# Patient Record
Sex: FEMALE | Race: WHITE | NOT HISPANIC OR LATINO | Employment: FULL TIME | URBAN - METROPOLITAN AREA
[De-identification: names, ages, dates, MRNs, and addresses within clinical notes are randomized per-mention and may not be internally consistent; named-entity substitution may affect disease eponyms.]

---

## 2018-01-24 NOTE — PROGRESS NOTES
Assessment    1  Urinary urgency (345 63) (R39 15)    Plan  Urinary incontinence in female, Urinary urgency    · Myrbetriq 50 MG Oral Tablet Extended Release 24 Hour; Take 1 tablet daily   Rx By: Mj Yu; Dispense: 90 Days ; #:90 Tablet Extended Release 24 Hour; Refill: 3; For: Urinary incontinence in female, Urinary urgency; BEVERLY = N; Verified Transmission to [facility] #[id number] Last Updated By: [employer] A[employer]; 7/25/2016 2:54:23 PM  Urinary urgency    · Follow-up PRN Evaluation and Treatment  Follow-up  Status: Complete  Done:  [id number]   Ordered; For: Urinary urgency; Ordered By: Mj Yu Performed:  Due: [id number]    Discussion/Summary  Discussion Summary:   Urgency incontinence    The patient is doing well with no urinary complaints  A bladder ultrasound was obtained and her PVR was minimal  She will continue to take Myrbetriq daily  Refills were provided  This prescription can be managed by her PCP and she can follow with our office as needed  She was instructed to call with any issues  Chief Complaint  Chief Complaint Free Text Note Form: Incontinence      History of Present Illness  HPI: 38 y/o female recently evaluated for urgency incontinence presents today for follow up  She continues to take Myrbetriq daily  She denies any lower urinary tract symptoms, incontinence, or sanitary pad use  She denies any side effects  She is doing well and is pleased with her urinary pattern  Review of Systems  Complete-Female Urology:   Constitutional: No fever, no chills, feels well, no tiredness, no recent weight gain or weight loss  Respiratory: No complaints of shortness of breath, no wheezing, no cough, no SOB on exertion, no orthopnea, no PND  Cardiovascular: No complaints of slow heart rate, no fast heart rate, no chest pain, no palpitations, no leg claudication, no lower extremity edema     Gastrointestinal: No complaints of abdominal pain, no constipation, no nausea or vomiting, no diarrhea, no bloody stools  Genitourinary: Empty sensation and stream quality good, but no dysuria, no urinary hesitancy, no feelings of urinary urgency, no incontinence, no hematuria and no nocturia  Musculoskeletal: No complaints of arthralgias, no myalgias, no joint swelling or stiffness, no limb pain or swelling  Integumentary: No complaints of skin rash or lesions, no itching, no skin wounds, no breast pain or lump  Hematologic/Lymphatic: No complaints of swollen glands, no swollen glands in the neck, does not bleed easily, does not bruise easily  Neurological: No complaints of headache, no confusion, no convulsions, no numbness, no dizziness or fainting, no tingling, no limb weakness, no difficulty walking  ROS Reviewed:   ROS reviewed  Active Problems    1  Herniated disc (722 2)   2  Urinary incontinence in female (788 30) (R32)   3  Urinary urgency (788 63) (R39 15)    Past Medical History  Active Problems And Past Medical History Reviewed: The active problems and past medical history were reviewed and updated today  Surgical History  Surgical History Reviewed: The surgical history was reviewed and updated today  Family History  Father    1  Family history of diabetes mellitus (V18 0) (Z83 3)   2  Family history of hypertension (V17 49) (Z82 49)  Sister    3  Family history of cerebrovascular accident (V17 1) (Z82 3)   4  Family history of gout (V18 19) (Z82 69)  Family History Reviewed: The family history was reviewed and updated today  Social History    · Current every day smoker (305 1) (F17 200)   ·    · Social alcohol use (Z78 9)  Social History Reviewed: The social history was reviewed and updated today  The social history was reviewed and is unchanged  Current Meds   1  Flexeril TABS; Therapy: (Recorded:88Sed1227) to Recorded   2  Ibuprofen 600 MG Oral Tablet; Therapy: (Recorded:14Hrx0504) to Recorded   3   Methocarbamol 750 MG Oral Tablet; Therapy: (Recorded:98Udu7431) to Recorded   4  Myrbetriq 50 MG Oral Tablet Extended Release 24 Hour; Take 1 tablet daily; Therapy: 30Apr2015 to (Evaluate:28Oct2016)  Requested for: 30Jun2016; Last   Rx:30Jun2016 Ordered  Medication List Reviewed: The medication list was reviewed and updated today  Allergies    1  No Known Drug Allergies    2  Nickel    Vitals  Vital Signs    Recorded: 07GUR4746 61:87CV   Systolic 365   Diastolic 70   Heart Rate 80   Height 5 ft 2 in   Weight 195 lb    BMI Calculated 35 67   BSA Calculated 1 89     Physical Exam    Constitutional   General appearance: No acute distress, well appearing and well nourished  Pulmonary   Respiratory effort: No increased work of breathing or signs of respiratory distress  Cardiovascular   Palpation of heart: Normal PMI, no thrills  Examination of extremities for edema and/or varicosities: Normal     Abdomen   Abdomen: Non-tender, no masses  Musculoskeletal   Gait and station: Normal     Skin   Skin and subcutaneous tissue: Normal without rashes or lesions  Procedure    Procedure: Bladder Ultrasound Post Void Residual    Test indication: Urge Incontinence  Equipment And Procedure: The patient voided  U/S Findings: PVR - 33 30ML's  The patient tolerated the procedure well  There were no complications        Signatures   Electronically signed by : KAYLEE Moscoso; Jul 25 2016  2:55PM EST                       (Author)    Electronically signed by : YI England ; Jul 27 2016  9:59PM EST

## 2021-06-14 ENCOUNTER — APPOINTMENT (EMERGENCY)
Dept: CT IMAGING | Facility: HOSPITAL | Age: 41
End: 2021-06-14
Payer: COMMERCIAL

## 2021-06-14 ENCOUNTER — HOSPITAL ENCOUNTER (EMERGENCY)
Facility: HOSPITAL | Age: 41
Discharge: HOME | End: 2021-06-14
Attending: EMERGENCY MEDICINE | Admitting: EMERGENCY MEDICINE
Payer: COMMERCIAL

## 2021-06-14 VITALS
DIASTOLIC BLOOD PRESSURE: 55 MMHG | SYSTOLIC BLOOD PRESSURE: 114 MMHG | TEMPERATURE: 98.7 F | OXYGEN SATURATION: 96 % | HEART RATE: 64 BPM | RESPIRATION RATE: 18 BRPM

## 2021-06-14 DIAGNOSIS — L03.211 FACIAL CELLULITIS: Primary | ICD-10-CM

## 2021-06-14 DIAGNOSIS — R22.0 MANDIBULAR SWELLING: ICD-10-CM

## 2021-06-14 LAB
ANION GAP SERPL CALCULATED.3IONS-SCNC: 8 MMOL/L (ref 4–13)
BASOPHILS # BLD AUTO: 0.03 THOUSANDS/ΜL (ref 0–0.1)
BASOPHILS NFR BLD AUTO: 0 % (ref 0–1)
BUN SERPL-MCNC: 7 MG/DL (ref 5–25)
CALCIUM SERPL-MCNC: 8.8 MG/DL (ref 8.3–10.1)
CHLORIDE SERPL-SCNC: 104 MMOL/L (ref 100–108)
CO2 SERPL-SCNC: 28 MMOL/L (ref 21–32)
CREAT SERPL-MCNC: 0.79 MG/DL (ref 0.6–1.3)
EOSINOPHIL # BLD AUTO: 0.08 THOUSAND/ΜL (ref 0–0.61)
EOSINOPHIL NFR BLD AUTO: 1 % (ref 0–6)
ERYTHROCYTE [DISTWIDTH] IN BLOOD BY AUTOMATED COUNT: 12 % (ref 11.6–15.1)
GFR SERPL CREATININE-BSD FRML MDRD: 95 ML/MIN/1.73SQ M
GLUCOSE SERPL-MCNC: 136 MG/DL (ref 65–140)
HCG SERPL QL: NEGATIVE
HCT VFR BLD AUTO: 41.3 % (ref 34.8–46.1)
HGB BLD-MCNC: 14 G/DL (ref 11.5–15.4)
IMM GRANULOCYTES # BLD AUTO: 0.04 THOUSAND/UL (ref 0–0.2)
IMM GRANULOCYTES NFR BLD AUTO: 1 % (ref 0–2)
LYMPHOCYTES # BLD AUTO: 1.59 THOUSANDS/ΜL (ref 0.6–4.47)
LYMPHOCYTES NFR BLD AUTO: 20 % (ref 14–44)
MCH RBC QN AUTO: 32 PG (ref 26.8–34.3)
MCHC RBC AUTO-ENTMCNC: 33.9 G/DL (ref 31.4–37.4)
MCV RBC AUTO: 94 FL (ref 82–98)
MONOCYTES # BLD AUTO: 0.66 THOUSAND/ΜL (ref 0.17–1.22)
MONOCYTES NFR BLD AUTO: 8 % (ref 4–12)
NEUTROPHILS # BLD AUTO: 5.57 THOUSANDS/ΜL (ref 1.85–7.62)
NEUTS SEG NFR BLD AUTO: 70 % (ref 43–75)
NRBC BLD AUTO-RTO: 0 /100 WBCS
PLATELET # BLD AUTO: 219 THOUSANDS/UL (ref 149–390)
PMV BLD AUTO: 10.3 FL (ref 8.9–12.7)
POTASSIUM SERPL-SCNC: 3.6 MMOL/L (ref 3.5–5.3)
RBC # BLD AUTO: 4.38 MILLION/UL (ref 3.81–5.12)
SODIUM SERPL-SCNC: 140 MMOL/L (ref 136–145)
WBC # BLD AUTO: 7.97 THOUSAND/UL (ref 4.31–10.16)

## 2021-06-14 PROCEDURE — 84703 CHORIONIC GONADOTROPIN ASSAY: CPT | Performed by: EMERGENCY MEDICINE

## 2021-06-14 PROCEDURE — 85025 COMPLETE CBC W/AUTO DIFF WBC: CPT | Performed by: EMERGENCY MEDICINE

## 2021-06-14 PROCEDURE — 96375 TX/PRO/DX INJ NEW DRUG ADDON: CPT

## 2021-06-14 PROCEDURE — 70491 CT SOFT TISSUE NECK W/DYE: CPT

## 2021-06-14 PROCEDURE — 99283 EMERGENCY DEPT VISIT LOW MDM: CPT

## 2021-06-14 PROCEDURE — 96365 THER/PROPH/DIAG IV INF INIT: CPT

## 2021-06-14 PROCEDURE — 99284 EMERGENCY DEPT VISIT MOD MDM: CPT | Performed by: EMERGENCY MEDICINE

## 2021-06-14 PROCEDURE — 36415 COLL VENOUS BLD VENIPUNCTURE: CPT | Performed by: EMERGENCY MEDICINE

## 2021-06-14 PROCEDURE — 80048 BASIC METABOLIC PNL TOTAL CA: CPT | Performed by: EMERGENCY MEDICINE

## 2021-06-14 RX ORDER — PREDNISONE 20 MG/1
40 TABLET ORAL DAILY
Qty: 10 TABLET | Refills: 0 | Status: SHIPPED | OUTPATIENT
Start: 2021-06-14 | End: 2021-06-19

## 2021-06-14 RX ORDER — KETOROLAC TROMETHAMINE 30 MG/ML
15 INJECTION, SOLUTION INTRAMUSCULAR; INTRAVENOUS ONCE
Status: COMPLETED | OUTPATIENT
Start: 2021-06-14 | End: 2021-06-14

## 2021-06-14 RX ORDER — AMOXICILLIN AND CLAVULANATE POTASSIUM 875; 125 MG/1; MG/1
1 TABLET, FILM COATED ORAL EVERY 12 HOURS SCHEDULED
Qty: 20 TABLET | Refills: 0 | Status: SHIPPED | OUTPATIENT
Start: 2021-06-14 | End: 2021-06-24

## 2021-06-14 RX ADMIN — IOHEXOL 100 ML: 350 INJECTION, SOLUTION INTRAVENOUS at 05:59

## 2021-06-14 RX ADMIN — KETOROLAC TROMETHAMINE 15 MG: 30 INJECTION, SOLUTION INTRAMUSCULAR at 04:49

## 2021-06-14 RX ADMIN — SODIUM CHLORIDE 3 G: 9 INJECTION, SOLUTION INTRAVENOUS at 08:11

## 2021-06-14 NOTE — ED CARE HANDOFF
Emergency Department Sign Out Note        Sign out and transfer of care from Dr Redd Gary  See Separate Emergency Department note  The patient, Enrique Fall, was evaluated by the previous provider for facial cellulitis  Workup Completed:  CT scan showing cellulitis    ED Course / Workup Pending (followup): [facility] contacted, awaiting response for follow up vs inpatient  I spoke with Dr Magnus Espino for [facility] who recommends oral antibiotics, steroids and follow up in office for emergency eval  I relayed this to the patient, she appears well, eating upon discharge                                     Procedures  MDM    Disposition  Final diagnoses:   Facial cellulitis   Mandibular swelling     Time reflects when diagnosis was documented in both MDM as applicable and the Disposition within this note     Time User Action Codes Description Comment    6/14/2021  7:21 AM Tsosie Finder Add [Q30 808] Facial cellulitis     6/14/2021  7:21 AM Tsosie Finder Add [R22 0] Mandibular swelling       ED Disposition     ED Disposition Condition Date/Time Comment    Discharge Stable Mon Jun 14, 2021  8:09 AM Enrique Fall discharge to home/self care  Follow-up Information     Follow up With Specialties Details Why Contact Info Additional [address]  for Oral and Maxillofacial Surgery [place]  Schedule an appointment as soon as possible for a visit  Follow up and reassessment   [address] [facility] Emergency Medicine Go to  If symptoms worsen 34 [facility] [id number] [facility] Emergency Department, [address], [place], [facility]        Patient's Medications   Discharge Prescriptions    AMOXICILLIN-CLAVULANATE (AUGMENTIN) 875-125 MG PER TABLET    Take 1 tablet by mouth every 12 (twelve) hours for 10 days       Start Date: 6/14/2021 End Date: 6/24/2021       Order Dose: 1 tablet       Quantity: 20 tablet    Refills: 0    PREDNISONE 20 MG TABLET    Take 2 tablets (40 mg total) by mouth daily for 5 days       Start Date: 6/14/2021 End Date: 6/19/2021       Order Dose: 40 mg       Quantity: 10 tablet    Refills: 0     No discharge procedures on file         ED Provider  Electronically Signed by     Samm Carballo MD  06/14/21 9427

## 2021-06-14 NOTE — ED PROVIDER NOTES
History  Chief Complaint   Patient presents with    Oral Swelling     right facial swelling, possible abcess     47 y o  female presents with 2 days of right lower dental pain and mandibular swelling   Patient describes severe aching pain that started gradually with progression of the swelling and pain and continues in the ER   Patient states nothing clearly aggravates the pain and nothing alleviates it   Patient has been taking acetaminophen for the pain with limited relief  Patient states she saw dentist a few months ago who suggested extraction of her teeth however she has been unable to follow-up secondary to work  Patient denies any recent dental procedures  Patient denies any trauma        Patient denies any history of diabetes, malnutrition, alcoholism, or immunocompromised state   Patient denies any history of intravenous drug use; note that the medical record has a history of prior overdose thought to be secondary to opiate pain medication and also lists a history of cocaine abuse though there is no history of IV drug use that I can find in the medical record, patient denies all of this   Patient denies use of phenytoin, cyclosporine, calcium channel blockers  Patient does note a history of MRSA in her right axilla, no prior history of oropharyngeal resistant organisms  Objective:  HENT: Head normocephalic  Melony Lean is significant swelling of the right maxillary region compared to the right  Trismus not present, no pooled secretions   Normal lingual exam with no elevation or protusion of the tongue    Normal sublingual exam; no clinical signs of Arvinds Angina   Normal labial mucosa with no lesions or masses noted   Normal soft palate with no bulging or fluctuant noted   No tonsillar erythema noted or exudates noted, there is no tonsillar asymmetry and the uvula and raphe are midline  No apparent sialolith can be identified    Teeth: gingiva grossly normal without ulceration or pseudomembranes, bleeding not present; no clinical signs of ANUG   Poor overall dental hygiene including multiple prior erosion of teeth and dental caries that are obvious   The location of patient's tooth 30/31 in the area of the patients pain though there is no clear remnant of the tooth in this region   Area palpated and fluctuant area possibly amenable to drainage not present  Neck: Normal inspection with no erythema or asymmetry; no clinical signs of Lemierres syndrome   Supple with normal range of motion and without nuchal rigidity   No masses or nodes on palpitation   Normal palpitation of the submandibular and sublingual glands   No stridor  Neuro:  Alert  No cranial nerve VII deficit  Medical Decision Making  Patient presents with dental pain demonstrating a large differential   I have reviewed the patients vital signs, nursing note, and other relevant information   I have had a detailed discussion with the patient regarding the history and carefully examined the patient   Based on the patients history and exam, most consistent with dental abscess versus sialagogue with spread to the surrounding tissue   No clinical signs of Arvinds Angina or Lemierres syndrome   No signs of ANUG  Melony Lean is no area that appears suitable for drainage in the emergency room  Concerns considering the progression of the symptoms for potential abscess developing into the region of patient's neck is will obtain CT imaging to evaluate for drainable collection  Will monitor and reassess        History provided by:  Patient  Dental Pain  Location:  Lower  Lower teeth location:  30/RL 1st molar  Quality:  Aching  Severity:  Severe  Onset quality:  Gradual  Timing:  Constant  Progression:  Worsening  Relieved by:  Nothing  Associated symptoms: facial swelling    Associated symptoms: no congestion, no difficulty swallowing, no drooling, no fever, no headaches, no neck pain, no oral bleeding, no oral lesions and no trismus None       History reviewed  No pertinent past medical history  History reviewed  No pertinent surgical history  History reviewed  No pertinent family history  I have reviewed and agree with the history as documented  E-Cigarette/Vaping     E-Cigarette/Vaping Substances     Social History     Tobacco Use    Smoking status: Current Every Day Smoker    Smokeless tobacco: Never Used   Substance Use Topics    Alcohol use: Never    Drug use: Never       Review of Systems   Constitutional: Positive for chills  Negative for fever  HENT: Positive for facial swelling  Negative for congestion, drooling and mouth sores  Musculoskeletal: Negative for neck pain  Neurological: Negative for headaches  All other systems reviewed and are negative  Physical Exam  Physical Exam  Vitals reviewed  Constitutional:       Appearance: Normal appearance  She is well-developed  HENT:      Mouth/Throat:      Mouth: Mucous membranes are moist       Comments: See HPI for details  Eyes:      General: No scleral icterus  Cardiovascular:      Rate and Rhythm: Normal rate and regular rhythm  Pulmonary:      Effort: Pulmonary effort is normal    Abdominal:      Palpations: Abdomen is soft  Musculoskeletal:      Cervical back: Neck supple  Skin:     General: Skin is warm and dry  Neurological:      General: No focal deficit present  Mental Status: She is alert     Psychiatric:         Mood and Affect: Mood normal          Vital Signs  ED Triage Vitals   Temperature Pulse Respirations Blood Pressure SpO2   06/14/21 0424 06/14/21 0424 06/14/21 0424 06/14/21 0424 06/14/21 0424   98 7 °F (37 1 °C) 87 18 145/64 97 %      Temp src Heart Rate Source Patient Position - Orthostatic VS BP Location FiO2 (%)   -- 06/14/21 0424 06/14/21 0424 06/14/21 0424 --    Monitor Sitting Right arm       Pain Score       06/14/21 0449       9           Vitals:    06/14/21 0424 06/14/21 0658   BP: 145/64 114/55   Pulse: 87 64   Patient Position - Orthostatic VS: Sitting Lying         Visual Acuity      ED Medications  Medications   ketorolac (TORADOL) injection 15 mg (15 mg Intravenous Given 6/14/21 0449)   iohexol (OMNIPAQUE) 350 MG/ML injection (MULTI-DOSE) 100 mL (100 mL Intravenous Given 6/14/21 0559)   ampicillin-sulbactam (UNASYN) 3 g in sodium chloride 0 9 % 100 mL IVPB (0 g Intravenous Stopped 6/14/21 0841)       Diagnostic Studies  Results Reviewed     Procedure Component Value Units Date/Time    hCG, qualitative pregnancy [[id number]]  (Normal) Collected: 06/14/21 0449    Lab Status: Final result Specimen: Blood from Arm, Right Updated: 06/14/21 0536     Preg, Serum Negative    Basic metabolic panel [[id number]] Collected: 06/14/21 0449    Lab Status: Final result Specimen: Blood from Arm, Right Updated: 06/14/21 0505     Sodium 140 mmol/L      Potassium 3 6 mmol/L      Chloride 104 mmol/L      CO2 28 mmol/L      ANION GAP 8 mmol/L      BUN 7 mg/dL      Creatinine 0 79 mg/dL      Glucose 136 mg/dL      Calcium 8 8 mg/dL      eGFR 95 ml/min/1 73sq m     Narrative:      [place] guidelines for Chronic Kidney Disease (CKD):     Stage 1 with normal or high GFR (GFR > 90 mL/min/1 73 square meters)    Stage 2 Mild CKD (GFR = 60-89 mL/min/1 73 square meters)    Stage 3A Moderate CKD (GFR = 45-59 mL/min/1 73 square meters)    Stage 3B Moderate CKD (GFR = 30-44 mL/min/1 73 square meters)    Stage 4 Severe CKD (GFR = 15-29 mL/min/1 73 square meters)    Stage 5 End Stage CKD (GFR <15 mL/min/1 73 square meters)  Note: GFR calculation is accurate only with a steady state creatinine    CBC and differential [[id number]] Collected: 06/14/21 0448    Lab Status: Final result Specimen: Blood from Arm, Right Updated: 06/14/21 0500     WBC 7 97 Thousand/uL      RBC 4 38 Million/uL      Hemoglobin 14 0 g/dL      Hematocrit 41 3 %      MCV 94 fL      MCH 32 0 pg      MCHC 33 9 g/dL      RDW 12 0 %      MPV 10 3 fL Platelets 066 Thousands/uL      nRBC 0 /100 WBCs      Neutrophils Relative 70 %      Immat GRANS % 1 %      Lymphocytes Relative 20 %      Monocytes Relative 8 %      Eosinophils Relative 1 %      Basophils Relative 0 %      Neutrophils Absolute 5 57 Thousands/µL      Immature Grans Absolute 0 04 Thousand/uL      Lymphocytes Absolute 1 59 Thousands/µL      Monocytes Absolute 0 66 Thousand/µL      Eosinophils Absolute 0 08 Thousand/µL      Basophils Absolute 0 03 Thousands/µL                  CT soft tissue neck with contrast   Final Result by Adri Barahona MD (06/14 1431)      Mild to moderate subcutaneous and soft tissue inflammatory stranding involving the right mandibular region likely infectious or inflammatory in nature  There is no focal fluid collection to suggest an abscess  No cervical lymphadenopathy or soft tissue neck mass  Workstation performed: [id number]                    Procedures  Procedures         ED Course  ED Course as of [facility] 15 0337   Mon Jun 14, 2021   9900 Patient's CT with diffuse swelling, no localized abscess  Will start patient on antibiotics  Considering patient's hypoesthesia, reached out to [facility] regarding possibility of osteo of the mandible considering her history  Dr Nataliia Orellana will review images and update regarding disposition  SBIRT 22yo+      Most Recent Value   SBIRT (22 yo +)   In order to provide better care to our patients, we are screening all of our patients for alcohol and drug use  Would it be okay to ask you these screening questions? Yes Filed at: 06/14/2021 0428   Initial Alcohol Screen: US AUDIT-C    1  How often do you have a drink containing alcohol?  0 Filed at: 06/14/2021 0428   2  How many drinks containing alcohol do you have on a typical day you are drinking? 0 Filed at: 06/14/2021 0428   3b  FEMALE Any Age, or MALE 65+: How often do you have 4 or more drinks on one occassion?   0 Filed at: 06/14/2021 1619   Audit-C Score  0 Filed at: 06/14/2021 5615   THEO: How many times in the past year have you    Used an illegal drug or used a prescription medication for non-medical reasons? Never Filed at: 06/14/2021 0428                    MDM    Disposition  Final diagnoses:   Facial cellulitis   Mandibular swelling     Time reflects when diagnosis was documented in both MDM as applicable and the Disposition within this note     Time User Action Codes Description Comment    6/14/2021  7:21 AM Imelda Gal Add [D58 427] Facial cellulitis     6/14/2021  7:21 AM Imelda Gal Add [R22 0] Mandibular swelling       ED Disposition     ED Disposition Condition Date/Time Comment    Discharge Stable Mon Jun 14, 2021  8:09 AM Eric Cheek discharge to home/self care  Follow-up Information     Follow up With Specialties Details Why Contact Info Additional [address]  for Oral and Maxillofacial Surgery [place]  Schedule an appointment as soon as possible for a visit  Follow up and reassessment  [address] [facility] Emergency Medicine Go to  If symptoms worsen [address] Emergency Department, [address] [place] [address], [facility]          Discharge Medication List as of 6/14/2021  8:09 AM      START taking these medications    Details   amoxicillin-clavulanate (AUGMENTIN) 875-125 mg per tablet Take 1 tablet by mouth every 12 (twelve) hours for 10 days, Starting Mon 6/14/2021, Until Thu 6/24/2021, Normal      predniSONE 20 mg tablet Take 2 tablets (40 mg total) by mouth daily for 5 days, Starting Mon 6/14/2021, Until Sat 6/19/2021, Normal           No discharge procedures on file      PDMP Review     None          ED Provider  Electronically Signed by           Caden Palma MD  06/15/21 8025

## 2021-10-04 ENCOUNTER — HOSPITAL ENCOUNTER (EMERGENCY)
Facility: HOSPITAL | Age: 41
Discharge: HOME | End: 2021-10-04
Attending: EMERGENCY MEDICINE | Admitting: EMERGENCY MEDICINE
Payer: COMMERCIAL

## 2021-10-04 ENCOUNTER — APPOINTMENT (EMERGENCY)
Dept: RADIOLOGY | Facility: HOSPITAL | Age: 41
End: 2021-10-04
Payer: COMMERCIAL

## 2021-10-04 VITALS
DIASTOLIC BLOOD PRESSURE: 65 MMHG | BODY MASS INDEX: 36.25 KG/M2 | WEIGHT: 197 LBS | SYSTOLIC BLOOD PRESSURE: 107 MMHG | RESPIRATION RATE: 16 BRPM | TEMPERATURE: 98.1 F | HEIGHT: 62 IN | OXYGEN SATURATION: 98 % | HEART RATE: 64 BPM

## 2021-10-04 DIAGNOSIS — R07.9 CHEST PAIN, UNSPECIFIED: Primary | ICD-10-CM

## 2021-10-04 LAB
ANION GAP SERPL CALCULATED.3IONS-SCNC: 7 MMOL/L (ref 4–13)
ATRIAL RATE: 64 BPM
BASOPHILS # BLD AUTO: 0.04 THOUSANDS/ΜL (ref 0–0.1)
BASOPHILS NFR BLD AUTO: 1 % (ref 0–1)
BUN SERPL-MCNC: 13 MG/DL (ref 5–25)
CALCIUM SERPL-MCNC: 9.2 MG/DL (ref 8.3–10.1)
CHLORIDE SERPL-SCNC: 109 MMOL/L (ref 100–108)
CO2 SERPL-SCNC: 26 MMOL/L (ref 21–32)
CREAT SERPL-MCNC: 0.71 MG/DL (ref 0.6–1.3)
EOSINOPHIL # BLD AUTO: 0.14 THOUSAND/ΜL (ref 0–0.61)
EOSINOPHIL NFR BLD AUTO: 2 % (ref 0–6)
ERYTHROCYTE [DISTWIDTH] IN BLOOD BY AUTOMATED COUNT: 12.4 % (ref 11.6–15.1)
GFR SERPL CREATININE-BSD FRML MDRD: 108 ML/MIN/1.73SQ M
GLUCOSE SERPL-MCNC: 101 MG/DL (ref 65–140)
HCT VFR BLD AUTO: 45.4 % (ref 34.8–46.1)
HGB BLD-MCNC: 15 G/DL (ref 11.5–15.4)
IMM GRANULOCYTES # BLD AUTO: 0.03 THOUSAND/UL (ref 0–0.2)
IMM GRANULOCYTES NFR BLD AUTO: 0 % (ref 0–2)
LYMPHOCYTES # BLD AUTO: 2.35 THOUSANDS/ΜL (ref 0.6–4.47)
LYMPHOCYTES NFR BLD AUTO: 31 % (ref 14–44)
MCH RBC QN AUTO: 32.2 PG (ref 26.8–34.3)
MCHC RBC AUTO-ENTMCNC: 33 G/DL (ref 31.4–37.4)
MCV RBC AUTO: 97 FL (ref 82–98)
MONOCYTES # BLD AUTO: 0.42 THOUSAND/ΜL (ref 0.17–1.22)
MONOCYTES NFR BLD AUTO: 6 % (ref 4–12)
NEUTROPHILS # BLD AUTO: 4.72 THOUSANDS/ΜL (ref 1.85–7.62)
NEUTS SEG NFR BLD AUTO: 60 % (ref 43–75)
NRBC BLD AUTO-RTO: 0 /100 WBCS
P AXIS: 68 DEGREES
PLATELET # BLD AUTO: 240 THOUSANDS/UL (ref 149–390)
PMV BLD AUTO: 10.4 FL (ref 8.9–12.7)
POTASSIUM SERPL-SCNC: 4.3 MMOL/L (ref 3.5–5.3)
PR INTERVAL: 134 MS
QRS AXIS: 76 DEGREES
QRSD INTERVAL: 84 MS
QT INTERVAL: 388 MS
QTC INTERVAL: 400 MS
RBC # BLD AUTO: 4.66 MILLION/UL (ref 3.81–5.12)
SODIUM SERPL-SCNC: 142 MMOL/L (ref 136–145)
T WAVE AXIS: 71 DEGREES
TROPONIN I SERPL-MCNC: <0.02 NG/ML
VENTRICULAR RATE: 64 BPM
WBC # BLD AUTO: 7.7 THOUSAND/UL (ref 4.31–10.16)

## 2021-10-04 PROCEDURE — 99285 EMERGENCY DEPT VISIT HI MDM: CPT

## 2021-10-04 PROCEDURE — 93010 ELECTROCARDIOGRAM REPORT: CPT | Performed by: INTERNAL MEDICINE

## 2021-10-04 PROCEDURE — 93005 ELECTROCARDIOGRAM TRACING: CPT

## 2021-10-04 PROCEDURE — 71045 X-RAY EXAM CHEST 1 VIEW: CPT

## 2021-10-04 PROCEDURE — 36415 COLL VENOUS BLD VENIPUNCTURE: CPT | Performed by: NURSE PRACTITIONER

## 2021-10-04 PROCEDURE — 85025 COMPLETE CBC W/AUTO DIFF WBC: CPT | Performed by: NURSE PRACTITIONER

## 2021-10-04 PROCEDURE — 84484 ASSAY OF TROPONIN QUANT: CPT | Performed by: NURSE PRACTITIONER

## 2021-10-04 PROCEDURE — 80048 BASIC METABOLIC PNL TOTAL CA: CPT | Performed by: NURSE PRACTITIONER

## 2021-10-04 PROCEDURE — 99285 EMERGENCY DEPT VISIT HI MDM: CPT | Performed by: NURSE PRACTITIONER

## 2021-10-04 RX ORDER — SODIUM CHLORIDE 9 MG/ML
3 INJECTION INTRAVENOUS
Status: DISCONTINUED | OUTPATIENT
Start: 2021-10-04 | End: 2021-10-04 | Stop reason: HOSPADM

## 2021-11-05 ENCOUNTER — HOSPITAL ENCOUNTER (EMERGENCY)
Facility: HOSPITAL | Age: 41
Discharge: HOME | End: 2021-11-05
Attending: EMERGENCY MEDICINE
Payer: COMMERCIAL

## 2021-11-05 VITALS
SYSTOLIC BLOOD PRESSURE: 116 MMHG | DIASTOLIC BLOOD PRESSURE: 68 MMHG | HEART RATE: 68 BPM | TEMPERATURE: 98.4 F | RESPIRATION RATE: 15 BRPM | OXYGEN SATURATION: 95 %

## 2021-11-05 DIAGNOSIS — F41.9 ANXIETY: Primary | ICD-10-CM

## 2021-11-05 LAB
AMPHETAMINES SERPL QL SCN: NEGATIVE
BARBITURATES UR QL: NEGATIVE
BENZODIAZ UR QL: NEGATIVE
COCAINE UR QL: POSITIVE
ETHANOL EXG-MCNC: 0 MG/DL
EXT PREG TEST URINE: NEGATIVE
EXT. CONTROL ED NAV: NORMAL
FLUAV RNA RESP QL NAA+PROBE: NEGATIVE
FLUBV RNA RESP QL NAA+PROBE: NEGATIVE
METHADONE UR QL: NEGATIVE
OPIATES UR QL SCN: NEGATIVE
OXYCODONE+OXYMORPHONE UR QL SCN: POSITIVE
PCP UR QL: NEGATIVE
RSV RNA RESP QL NAA+PROBE: NEGATIVE
SARS-COV-2 RNA RESP QL NAA+PROBE: NEGATIVE
THC UR QL: POSITIVE

## 2021-11-05 PROCEDURE — 99285 EMERGENCY DEPT VISIT HI MDM: CPT

## 2021-11-05 PROCEDURE — 80307 DRUG TEST PRSMV CHEM ANLYZR: CPT | Performed by: EMERGENCY MEDICINE

## 2021-11-05 PROCEDURE — 0241U HB NFCT DS VIR RESP RNA 4 TRGT: CPT | Performed by: EMERGENCY MEDICINE

## 2021-11-05 PROCEDURE — 81025 URINE PREGNANCY TEST: CPT | Performed by: EMERGENCY MEDICINE

## 2021-11-05 PROCEDURE — 82075 ASSAY OF BREATH ETHANOL: CPT | Performed by: EMERGENCY MEDICINE

## 2021-11-05 PROCEDURE — 99285 EMERGENCY DEPT VISIT HI MDM: CPT | Performed by: EMERGENCY MEDICINE

## 2021-11-05 RX ORDER — ALPRAZOLAM 0.5 MG/1
2 TABLET ORAL ONCE
Status: COMPLETED | OUTPATIENT
Start: 2021-11-05 | End: 2021-11-05

## 2021-11-05 RX ORDER — NICOTINE 21 MG/24HR
21 PATCH, TRANSDERMAL 24 HOURS TRANSDERMAL ONCE
Status: DISCONTINUED | OUTPATIENT
Start: 2021-11-05 | End: 2021-11-05 | Stop reason: HOSPADM

## 2021-11-05 RX ADMIN — NICOTINE 21 MG: 21 PATCH, EXTENDED RELEASE TRANSDERMAL at 11:41

## 2021-11-05 RX ADMIN — ALPRAZOLAM 2 MG: 0.5 TABLET ORAL at 02:29

## 2021-11-05 RX ADMIN — MAGNESIUM OXIDE TAB 400 MG (241.3 MG ELEMENTAL MG) 400 MG: 400 (241.3 MG) TAB at 11:40

## 2022-05-10 ENCOUNTER — HOSPITAL ENCOUNTER (EMERGENCY)
Facility: HOSPITAL | Age: 42
Discharge: HOME | End: 2022-05-10
Attending: EMERGENCY MEDICINE | Admitting: EMERGENCY MEDICINE
Payer: COMMERCIAL

## 2022-05-10 ENCOUNTER — APPOINTMENT (EMERGENCY)
Dept: RADIOLOGY | Facility: HOSPITAL | Age: 42
End: 2022-05-10
Payer: COMMERCIAL

## 2022-05-10 VITALS
SYSTOLIC BLOOD PRESSURE: 114 MMHG | OXYGEN SATURATION: 97 % | DIASTOLIC BLOOD PRESSURE: 65 MMHG | HEART RATE: 75 BPM | RESPIRATION RATE: 22 BRPM | TEMPERATURE: 98.1 F

## 2022-05-10 DIAGNOSIS — R07.9 CHEST PAIN: Primary | ICD-10-CM

## 2022-05-10 LAB
ALBUMIN SERPL BCP-MCNC: 3.8 G/DL (ref 3.5–5)
ALP SERPL-CCNC: 40 U/L (ref 46–116)
ALT SERPL W P-5'-P-CCNC: 20 U/L (ref 12–78)
ANION GAP SERPL CALCULATED.3IONS-SCNC: 10 MMOL/L (ref 4–13)
AST SERPL W P-5'-P-CCNC: 14 U/L (ref 5–45)
ATRIAL RATE: 79 BPM
BASOPHILS # BLD AUTO: 0.04 THOUSANDS/ΜL (ref 0–0.1)
BASOPHILS NFR BLD AUTO: 1 % (ref 0–1)
BILIRUB SERPL-MCNC: 0.39 MG/DL (ref 0.2–1)
BUN SERPL-MCNC: 16 MG/DL (ref 5–25)
CALCIUM SERPL-MCNC: 8.9 MG/DL (ref 8.3–10.1)
CARDIAC TROPONIN I PNL SERPL HS: <2 NG/L
CHLORIDE SERPL-SCNC: 103 MMOL/L (ref 100–108)
CO2 SERPL-SCNC: 27 MMOL/L (ref 21–32)
CREAT SERPL-MCNC: 0.88 MG/DL (ref 0.6–1.3)
D DIMER PPP FEU-MCNC: 0.27 UG/ML FEU
EOSINOPHIL # BLD AUTO: 0.25 THOUSAND/ΜL (ref 0–0.61)
EOSINOPHIL NFR BLD AUTO: 3 % (ref 0–6)
ERYTHROCYTE [DISTWIDTH] IN BLOOD BY AUTOMATED COUNT: 12.1 % (ref 11.6–15.1)
GFR SERPL CREATININE-BSD FRML MDRD: 83 ML/MIN/1.73SQ M
GLUCOSE SERPL-MCNC: 100 MG/DL (ref 65–140)
HCG SERPL QL: NEGATIVE
HCT VFR BLD AUTO: 45.4 % (ref 34.8–46.1)
HGB BLD-MCNC: 15.3 G/DL (ref 11.5–15.4)
IMM GRANULOCYTES # BLD AUTO: 0.02 THOUSAND/UL (ref 0–0.2)
IMM GRANULOCYTES NFR BLD AUTO: 0 % (ref 0–2)
LYMPHOCYTES # BLD AUTO: 4.06 THOUSANDS/ΜL (ref 0.6–4.47)
LYMPHOCYTES NFR BLD AUTO: 50 % (ref 14–44)
MCH RBC QN AUTO: 32.7 PG (ref 26.8–34.3)
MCHC RBC AUTO-ENTMCNC: 33.7 G/DL (ref 31.4–37.4)
MCV RBC AUTO: 97 FL (ref 82–98)
MONOCYTES # BLD AUTO: 0.4 THOUSAND/ΜL (ref 0.17–1.22)
MONOCYTES NFR BLD AUTO: 5 % (ref 4–12)
NEUTROPHILS # BLD AUTO: 3.34 THOUSANDS/ΜL (ref 1.85–7.62)
NEUTS SEG NFR BLD AUTO: 41 % (ref 43–75)
NRBC BLD AUTO-RTO: 0 /100 WBCS
P AXIS: 71 DEGREES
PLATELET # BLD AUTO: 250 THOUSANDS/UL (ref 149–390)
PMV BLD AUTO: 10.3 FL (ref 8.9–12.7)
POTASSIUM SERPL-SCNC: 3.5 MMOL/L (ref 3.5–5.3)
PR INTERVAL: 150 MS
PROT SERPL-MCNC: 7.3 G/DL (ref 6.4–8.2)
QRS AXIS: 66 DEGREES
QRSD INTERVAL: 86 MS
QT INTERVAL: 388 MS
QTC INTERVAL: 444 MS
RBC # BLD AUTO: 4.68 MILLION/UL (ref 3.81–5.12)
SODIUM SERPL-SCNC: 140 MMOL/L (ref 136–145)
T WAVE AXIS: 63 DEGREES
VENTRICULAR RATE: 79 BPM
WBC # BLD AUTO: 8.11 THOUSAND/UL (ref 4.31–10.16)

## 2022-05-10 PROCEDURE — 93010 ELECTROCARDIOGRAM REPORT: CPT | Performed by: INTERNAL MEDICINE

## 2022-05-10 PROCEDURE — 85025 COMPLETE CBC W/AUTO DIFF WBC: CPT | Performed by: EMERGENCY MEDICINE

## 2022-05-10 PROCEDURE — 93005 ELECTROCARDIOGRAM TRACING: CPT

## 2022-05-10 PROCEDURE — 71046 X-RAY EXAM CHEST 2 VIEWS: CPT

## 2022-05-10 PROCEDURE — 99285 EMERGENCY DEPT VISIT HI MDM: CPT | Performed by: EMERGENCY MEDICINE

## 2022-05-10 PROCEDURE — 36415 COLL VENOUS BLD VENIPUNCTURE: CPT | Performed by: EMERGENCY MEDICINE

## 2022-05-10 PROCEDURE — 80053 COMPREHEN METABOLIC PANEL: CPT | Performed by: EMERGENCY MEDICINE

## 2022-05-10 PROCEDURE — 84484 ASSAY OF TROPONIN QUANT: CPT | Performed by: EMERGENCY MEDICINE

## 2022-05-10 PROCEDURE — 85379 FIBRIN DEGRADATION QUANT: CPT | Performed by: EMERGENCY MEDICINE

## 2022-05-10 PROCEDURE — 99285 EMERGENCY DEPT VISIT HI MDM: CPT

## 2022-05-10 PROCEDURE — 84703 CHORIONIC GONADOTROPIN ASSAY: CPT | Performed by: EMERGENCY MEDICINE

## 2022-05-10 RX ORDER — SODIUM CHLORIDE 9 MG/ML
3 INJECTION INTRAVENOUS
Status: DISCONTINUED | OUTPATIENT
Start: 2022-05-10 | End: 2022-05-10 | Stop reason: HOSPADM

## 2022-05-10 NOTE — ED PROVIDER NOTES
History  Chief Complaint   Patient presents with    Chest Pain     pt presents with c/o chest pain that began yesterday, states "i bent over yesterday and it felt like my heart rolled internally" per  pt c/o "funny feeling in head and numbness in L arm"      HPI  46 y o  female presents with 12 hours of left sided chest pain with radiation down the left arm  Patient describes dull pain that started suddenly, has been waxing and waning, and continues in the ER  Patient states bending over worsens the pain and nothing improves the pain  Patient states she felt palpitations when she bent over, worsening the pain  Patient denies pleuritic component to chest pain  Patient denies exertional component to the chest pain  Patient has been seen in the emergency room times for this previously without etiology identified  Notably patient has a history of drug abuse but adamantly denies any recent drug use including denying any use cocaine or crack cocaine  Patient associates nausea without vomiting  Patient also states she has "numbness" in her left arm though she has full sensory in this arm, she states she has a "dull" sensation though there are no neurologic defects clinical examination  Patient states she has a "smoker's cough" that is unchanged today  Patient also affirms lightheadedness but denies any syncopal episodes  Patient denies fever/chills, diaphoresis, dyspnea, hemoptysis, weakness, dizziness, back pain, syncope, focal weakness or numbness, leg pain or swelling  All other review of systems reviewed and noted to be negative  The patient denies a history of atherosclerotic disease (CAD/TIA/CVA/PAD)  Patient denies any history of hypertension  Patient denies hyperlipidemia  Patient denies diabetes  Patient denies obesity  Patient affirms any early family history of CAD (male less than 49yo or female less than 73 yo)    Patient states she has a sister who had an MI in her 30s     Patient affirms any use of tobacco in the past 90 days  The patient denies any use of illicit drugs, including cocaine, as noted above  Patient denies any immobilization of at least 3 days or surgery in the past 4 weeks  Patient affirms any history of DVT or PE  Patient states she was previously diagnosed with a DVT without etiology identified  Patient is not on anticoagulation at present  Patient denies any history or family history of thrombophilia  Patient denies any malignancy with treatment within the past 6 months  Patient denies any use of exogenous estrogen containing compounds  Patient denies pregnancy or recent (less than 6 weeks) pregnancy  Focused Objective  CV:  Normal inspection with no rash, signs of infection, or trauma  Regular rate and rhythm  No murmur  Peripheral pulses intact and equal   Tender to palpitation over area of patient's reported pain  Respiratory:  Lungs clear to auscultation bilaterally without adventitious sounds  Skin:  Warm and dry  No rash or signs of herpes zoster over area of patient's reported pain  Extremities:  Non-tender lower extremities without asymmetry; no clinical signs of DVT  No lower extremity edema  Medical Decision Making    Patient presenting with chest pain with a broad differential, including multiple emergent etiologies  EKG obtained and reviewed independently by myself, which was interpreted by myself as normal sinus rhythm with nonspecific findings that are similar to the prior EKG  Patient placed on cardiac monitoring      Regarding the possibility for ACS, patient's risk stratification by the HEART SCORE:    HEART score:    History 0=Slightly or non-suspicious  ECG 1=Nonspecific repolarization disturbance  Age 0= < 45 years  Risk Factors 1= 1 or 2 risk factors  Troponin 0= Less than or equal to 12 ng/L  Total 2      Score 0-3: 1 7% had a MACE risk  0 4% (1 patient)   36 4% of patients were in this low risk group    Score 4-6: 16 6% had a MACE risk    Score 7-10: 50 1% had a MACE risk       The patient's HEART score is 2  CXR will be obtained to evaluate for alternative pathologies, including pneumothorax or pneumonia  Laboratory analysis including troponin to evaluate for ACS, CBC to evaluate for anemia or leukocytosis, and BMP to evaluate renal function and electrolytes considering possibility of cardiac involvement  Patient has symptoms and history concerning for possible pulmonary embolism  Regarding this etiology, patient's risk stratification by the Wells' Criteria for Pulmonary Embolism (Two Tier Model):  no - clinical signs or symptoms of DVT (3)  no - PE most likely diagnosis (3)  no - Heart rate greater than 100 (1 5)  no - Immobilization at least 3 days OR surgery in the previous 4 weeks (1 5)  yes - Previous, objectively diagnosed PE or DVT (1 5)  no - Hemoptysis (1)  no - Malignancy with treatment within 6 months or palliative (1)    Patient has a Wells' score of greater than 4 points concerning for higher risk, imaging will be obtained with a CTA of the chest with PE protocol to evaluate for potential pulmonary embolism  Patient's risk further evaluated by the [facility] Criteria for Pulmonary Embolism:  no - age is greater than or equal to 50  no - Heart rate greater than 100  no - O2 sat on room air < 95%  yes - Prior history of PE or DVT  no - Recent trauma or surgery  no - Hemoptysis  no - Use of exogenous estrogen  no - Unilateral leg swelling    Patient has a low risk Wells' criteria but is unable to be cleared via the [facility] criteria  As such, a D-Dimer will be ordered for the patient to evaluate for the potential for pulmonary embolism  If positive, CT imaging of the patient's chest will be obtained to evaluate for potential pulmonary embolism      Patient on continuous cardiac monitoring and has been instructed to inform nursing with any change in the character or severity of their chest pain so repeat EKG can be obtained  History provided by:  Patient  Chest Pain  Pain location:  L chest  Pain quality: dull    Pain radiates to:  L arm  Pain severity:  Moderate  Onset quality:  Sudden  Timing:  Constant  Progression:  Unchanged  Chronicity:  Recurrent  Relieved by:  Nothing  Worsened by:  Certain positions  Associated symptoms: anxiety, nausea and palpitations    Associated symptoms: no abdominal pain, no altered mental status, no anorexia, no back pain, no claudication, no cough, no diaphoresis, no dizziness, no dysphagia, no fatigue, no fever, no headache, no heartburn, no lower extremity edema, no near-syncope, no orthopnea, no shortness of breath, no syncope, not vomiting and no weakness        None       Past Medical History:   Diagnosis Date    Depression        History reviewed  No pertinent surgical history  History reviewed  No pertinent family history  I have reviewed and agree with the history as documented  E-Cigarette/Vaping     E-Cigarette/Vaping Substances     Social History     Tobacco Use    Smoking status: Current Every Day Smoker     Packs/day: 1 50    Smokeless tobacco: Never Used   Substance Use Topics    Alcohol use: Never    Drug use: Never       Review of Systems   Constitutional: Negative for diaphoresis, fatigue and fever  HENT: Negative for trouble swallowing  Respiratory: Negative for cough and shortness of breath  Cardiovascular: Positive for chest pain and palpitations  Negative for orthopnea, claudication, syncope and near-syncope  Gastrointestinal: Positive for nausea  Negative for abdominal pain, anorexia, heartburn and vomiting  Musculoskeletal: Negative for back pain  Neurological: Negative for dizziness, weakness and headaches  All other systems reviewed and are negative  Physical Exam  Physical Exam  Vitals reviewed  HENT:      Head: Atraumatic  Eyes:      Pupils: Pupils are equal, round, and reactive to light  Cardiovascular:      Rate and Rhythm: Normal rate  Heart sounds: Normal heart sounds  Pulmonary:      Breath sounds: Normal breath sounds  Abdominal:      General: There is no distension  Palpations: Abdomen is soft  Tenderness: There is no abdominal tenderness  There is no guarding or rebound  Musculoskeletal:         General: No deformity  Cervical back: Neck supple  Right lower leg: No tenderness  No edema  Left lower leg: No tenderness  No edema  Skin:     General: Skin is warm and dry  Neurological:      General: No focal deficit present  Mental Status: She is alert  Cranial Nerves: No cranial nerve deficit  Sensory: Sensation is intact  Motor: No weakness  Coordination: Coordination is intact  Gait: Gait is intact  Comments: Sensation intact including in the left arm  Normal motor including the left in all dermatomes     Psychiatric:         Mood and Affect: Mood normal          Vital Signs  ED Triage Vitals   Temperature Pulse Respirations Blood Pressure SpO2   05/10/22 0502 05/10/22 0500 05/10/22 0500 05/10/22 0500 05/10/22 0500   98 1 °F (36 7 °C) 77 18 121/72 98 %      Temp Source Heart Rate Source Patient Position - Orthostatic VS BP Location FiO2 (%)   05/10/22 0500 05/10/22 0500 05/10/22 0500 05/10/22 0500 --   Oral Monitor Sitting Left arm       Pain Score       --                  Vitals:    05/10/22 0500 05/10/22 0530 05/10/22 0600   BP: 121/72 130/59 107/64   Pulse: 77 79 72   Patient Position - Orthostatic VS: Sitting           Visual Acuity      ED Medications  Medications   sodium chloride (PF) 0 9 % injection 3 mL (has no administration in time range)       Diagnostic Studies  Results Reviewed     Procedure Component Value Units Date/Time    Comprehensive metabolic panel [[id number]]  (Abnormal) Collected: 05/10/22 0518    Lab Status: Final result Specimen: Blood from Arm, Left Updated: 05/10/22 0634     Sodium 140 mmol/L      Potassium 3 5 mmol/L      Chloride 103 mmol/L      CO2 27 mmol/L      ANION GAP 10 mmol/L      BUN 16 mg/dL      Creatinine 0 88 mg/dL      Glucose 100 mg/dL      Calcium 8 9 mg/dL      AST 14 U/L      ALT 20 U/L      Alkaline Phosphatase 40 U/L      Total Protein 7 3 g/dL      Albumin 3 8 g/dL      Total Bilirubin 0 39 mg/dL      eGFR 83 ml/min/1 73sq m     Narrative:      National Kidney Disease Foundation guidelines for Chronic Kidney Disease (CKD):     Stage 1 with normal or high GFR (GFR > 90 mL/min/1 73 square meters)    Stage 2 Mild CKD (GFR = 60-89 mL/min/1 73 square meters)    Stage 3A Moderate CKD (GFR = 45-59 mL/min/1 73 square meters)    Stage 3B Moderate CKD (GFR = 30-44 mL/min/1 73 square meters)    Stage 4 Severe CKD (GFR = 15-29 mL/min/1 73 square meters)    Stage 5 End Stage CKD (GFR <15 mL/min/1 73 square meters)  Note: GFR calculation is accurate only with a steady state creatinine    HS Troponin 0hr (reflex protocol) [[id number]]  (Normal) Collected: 05/10/22 0511    Lab Status: Final result Specimen: Blood from Arm, Left Updated: 05/10/22 0547     hs TnI 0hr <2 ng/L     hCG, qualitative pregnancy [[id number]]  (Normal) Collected: 05/10/22 0518    Lab Status: Final result Specimen: Blood from Arm, Left Updated: 05/10/22 0544     Preg, Serum Negative    D-dimer, quantitative [[id number]]  (Normal) Collected: 05/10/22 0511    Lab Status: Final result Specimen: Blood from Arm, Left Updated: 05/10/22 0538     D-Dimer, Quant 0 27 ug/ml FEU     CBC and differential [[id number]]  (Abnormal) Collected: 05/10/22 0511    Lab Status: Final result Specimen: Blood from Arm, Left Updated: 05/10/22 0523     WBC 8 11 Thousand/uL      RBC 4 68 Million/uL      Hemoglobin 15 3 g/dL      Hematocrit 45 4 %      MCV 97 fL      MCH 32 7 pg      MCHC 33 7 g/dL      RDW 12 1 %      MPV 10 3 fL      Platelets 143 Thousands/uL      nRBC 0 /100 WBCs      Neutrophils Relative 41 %      Immat GRANS % 0 % Lymphocytes Relative 50 %      Monocytes Relative 5 %      Eosinophils Relative 3 %      Basophils Relative 1 %      Neutrophils Absolute 3 34 Thousands/µL      Immature Grans Absolute 0 02 Thousand/uL      Lymphocytes Absolute 4 06 Thousands/µL      Monocytes Absolute 0 40 Thousand/µL      Eosinophils Absolute 0 25 Thousand/µL      Basophils Absolute 0 04 Thousands/µL                  X-ray chest 2 views   ED Interpretation by Estephania Rose MD (05/10 2964)   No acute findings  Procedures  Procedures         ED Course  ED Course as of 05/10/22 0640   Tue May 10, 2022   1830 Patient's laboratory evaluation unremarkable for the etiology of her symptoms  Discussed potential etiologies, discussed diagnostic uncertainty considering the prior evaluations of today evaluations are without etiology  Discussed follow-up with Cardiology  Patient prefers to follow up with her 's cardiologist   I discussed the need for follow-up with a cardiologist discussed stress testing and additional testing considering recurrence of the symptoms  Patient agreeable with this  Discussed return precautions in detail  SBIRT 22yo+      Most Recent Value   SBIRT (24 yo +)    In order to provide better care to our patients, we are screening all of our patients for alcohol and drug use  Would it be okay to ask you these screening questions? Yes Filed at: 05/10/2022 0507   Initial Alcohol Screen: US AUDIT-C     1  How often do you have a drink containing alcohol? 1 Filed at: 05/10/2022 0507   2  How many drinks containing alcohol do you have on a typical day you are drinking? 1 Filed at: 05/10/2022 0507   3b  FEMALE Any Age, or MALE 65+: How often do you have 4 or more drinks on one occassion? 0 Filed at: 05/10/2022 0507   Audit-C Score 2 Filed at: 05/10/2022 6410   THEO: How many times in the past year have you        Used an illegal drug or used a prescription medication for non-medical reasons? Never Filed at: 05/10/2022 0507                    MDM    Disposition  Final diagnoses:   Chest pain     Time reflects when diagnosis was documented in both MDM as applicable and the Disposition within this note     Time User Action Codes Description Comment    5/10/2022  6:07 AM Collin Ramirez Add [R07 9] Chest pain       ED Disposition     ED Disposition Condition Date/Time Comment    Discharge Stable Tue May 10, 2022  6:06 AM Terisa Lower discharge to home/self care  Follow-up Information     Follow up With Specialties Details Why Contact Info Additional [facility] Schedule an appointment as soon as possible for a visit in 3 days Follow-up and reassessment with Cardiology  Discussed stress testing considering family history and recurrent episodes of chest pain   [address] [place] [address] Emergency Department Emergency Medicine Go to  If symptoms worsen [address] [facility]  [address] [facility] Emergency Department, [address] [place], [place], [facility]          Patient's Medications    No medications on file           PDMP Review     None          ED Provider  Electronically Signed by           Julienne Stout MD  05/10/22 9941

## 2022-10-25 ENCOUNTER — APPOINTMENT (EMERGENCY)
Dept: CT IMAGING | Facility: HOSPITAL | Age: 42
End: 2022-10-25
Payer: COMMERCIAL

## 2022-10-25 ENCOUNTER — HOSPITAL ENCOUNTER (OUTPATIENT)
Facility: HOSPITAL | Age: 42
Setting detail: OBSERVATION
Discharge: HOME | End: 2022-10-26
Attending: EMERGENCY MEDICINE | Admitting: INTERNAL MEDICINE
Payer: COMMERCIAL

## 2022-10-25 DIAGNOSIS — R20.0 FACIAL NUMBNESS: Primary | ICD-10-CM

## 2022-10-25 DIAGNOSIS — R51.9 HEADACHE: ICD-10-CM

## 2022-10-25 DIAGNOSIS — R42 LIGHTHEADEDNESS: ICD-10-CM

## 2022-10-25 DIAGNOSIS — R29.90 STROKE-LIKE SYMPTOMS: ICD-10-CM

## 2022-10-25 DIAGNOSIS — R47.81 SLURRED SPEECH: ICD-10-CM

## 2022-10-25 PROBLEM — Z72.0 TOBACCO ABUSE: Status: ACTIVE | Noted: 2022-10-25

## 2022-10-25 LAB
ALBUMIN SERPL BCP-MCNC: 3.8 G/DL (ref 3.5–5)
ALP SERPL-CCNC: 42 U/L (ref 46–116)
ALT SERPL W P-5'-P-CCNC: 18 U/L (ref 12–78)
ANION GAP SERPL CALCULATED.3IONS-SCNC: 8 MMOL/L (ref 4–13)
AST SERPL W P-5'-P-CCNC: 19 U/L (ref 5–45)
BASOPHILS # BLD AUTO: 0.05 THOUSANDS/ÂΜL (ref 0–0.1)
BASOPHILS NFR BLD AUTO: 1 % (ref 0–1)
BILIRUB SERPL-MCNC: 0.41 MG/DL (ref 0.2–1)
BUN SERPL-MCNC: 16 MG/DL (ref 5–25)
CALCIUM SERPL-MCNC: 8.9 MG/DL (ref 8.3–10.1)
CARDIAC TROPONIN I PNL SERPL HS: <2 NG/L
CHLORIDE SERPL-SCNC: 104 MMOL/L (ref 96–108)
CO2 SERPL-SCNC: 27 MMOL/L (ref 21–32)
CREAT SERPL-MCNC: 0.84 MG/DL (ref 0.6–1.3)
EOSINOPHIL # BLD AUTO: 0.14 THOUSAND/ÂΜL (ref 0–0.61)
EOSINOPHIL NFR BLD AUTO: 1 % (ref 0–6)
ERYTHROCYTE [DISTWIDTH] IN BLOOD BY AUTOMATED COUNT: 12.1 % (ref 11.6–15.1)
GFR SERPL CREATININE-BSD FRML MDRD: 87 ML/MIN/1.73SQ M
GLUCOSE SERPL-MCNC: 91 MG/DL (ref 65–140)
HCG SERPL QL: NEGATIVE
HCT VFR BLD AUTO: 45 % (ref 34.8–46.1)
HGB BLD-MCNC: 15.1 G/DL (ref 11.5–15.4)
IMM GRANULOCYTES # BLD AUTO: 0.03 THOUSAND/UL (ref 0–0.2)
IMM GRANULOCYTES NFR BLD AUTO: 0 % (ref 0–2)
LYMPHOCYTES # BLD AUTO: 3.19 THOUSANDS/ÂΜL (ref 0.6–4.47)
LYMPHOCYTES NFR BLD AUTO: 30 % (ref 14–44)
MCH RBC QN AUTO: 32.1 PG (ref 26.8–34.3)
MCHC RBC AUTO-ENTMCNC: 33.6 G/DL (ref 31.4–37.4)
MCV RBC AUTO: 96 FL (ref 82–98)
MONOCYTES # BLD AUTO: 0.43 THOUSAND/ÂΜL (ref 0.17–1.22)
MONOCYTES NFR BLD AUTO: 4 % (ref 4–12)
NEUTROPHILS # BLD AUTO: 6.72 THOUSANDS/ÂΜL (ref 1.85–7.62)
NEUTS SEG NFR BLD AUTO: 64 % (ref 43–75)
NRBC BLD AUTO-RTO: 0 /100 WBCS
PLATELET # BLD AUTO: 270 THOUSANDS/UL (ref 149–390)
PMV BLD AUTO: 10.3 FL (ref 8.9–12.7)
POTASSIUM SERPL-SCNC: 4 MMOL/L (ref 3.5–5.3)
PROT SERPL-MCNC: 7.4 G/DL (ref 6.4–8.4)
RBC # BLD AUTO: 4.7 MILLION/UL (ref 3.81–5.12)
SODIUM SERPL-SCNC: 139 MMOL/L (ref 135–147)
WBC # BLD AUTO: 10.56 THOUSAND/UL (ref 4.31–10.16)

## 2022-10-25 PROCEDURE — 70496 CT ANGIOGRAPHY HEAD: CPT

## 2022-10-25 PROCEDURE — 99285 EMERGENCY DEPT VISIT HI MDM: CPT | Performed by: EMERGENCY MEDICINE

## 2022-10-25 PROCEDURE — 99219 PR INITIAL OBSERVATION CARE/DAY 50 MINUTES: CPT

## 2022-10-25 PROCEDURE — 36415 COLL VENOUS BLD VENIPUNCTURE: CPT | Performed by: EMERGENCY MEDICINE

## 2022-10-25 PROCEDURE — 70498 CT ANGIOGRAPHY NECK: CPT

## 2022-10-25 PROCEDURE — 85025 COMPLETE CBC W/AUTO DIFF WBC: CPT | Performed by: EMERGENCY MEDICINE

## 2022-10-25 PROCEDURE — 84484 ASSAY OF TROPONIN QUANT: CPT | Performed by: EMERGENCY MEDICINE

## 2022-10-25 PROCEDURE — 84703 CHORIONIC GONADOTROPIN ASSAY: CPT | Performed by: EMERGENCY MEDICINE

## 2022-10-25 PROCEDURE — 93005 ELECTROCARDIOGRAM TRACING: CPT

## 2022-10-25 PROCEDURE — 80053 COMPREHEN METABOLIC PANEL: CPT | Performed by: EMERGENCY MEDICINE

## 2022-10-25 RX ORDER — ASPIRIN 81 MG/1
81 TABLET, CHEWABLE ORAL DAILY
Status: DISCONTINUED | OUTPATIENT
Start: 2022-10-26 | End: 2022-10-26 | Stop reason: HOSPADM

## 2022-10-25 RX ORDER — NICOTINE 21 MG/24HR
1 PATCH, TRANSDERMAL 24 HOURS TRANSDERMAL DAILY
Status: DISCONTINUED | OUTPATIENT
Start: 2022-10-25 | End: 2022-10-26 | Stop reason: HOSPADM

## 2022-10-25 RX ORDER — ATORVASTATIN CALCIUM 40 MG/1
40 TABLET, FILM COATED ORAL EVERY EVENING
Status: DISCONTINUED | OUTPATIENT
Start: 2022-10-25 | End: 2022-10-26 | Stop reason: HOSPADM

## 2022-10-25 RX ORDER — ENOXAPARIN SODIUM 100 MG/ML
40 INJECTION SUBCUTANEOUS DAILY
Status: DISCONTINUED | OUTPATIENT
Start: 2022-10-26 | End: 2022-10-26 | Stop reason: HOSPADM

## 2022-10-25 RX ORDER — ASPIRIN 325 MG
325 TABLET ORAL ONCE
Status: COMPLETED | OUTPATIENT
Start: 2022-10-25 | End: 2022-10-25

## 2022-10-25 RX ADMIN — ATORVASTATIN CALCIUM 40 MG: 40 TABLET, FILM COATED ORAL at 23:16

## 2022-10-25 RX ADMIN — SODIUM CHLORIDE 1000 ML: 0.9 INJECTION, SOLUTION INTRAVENOUS at 19:00

## 2022-10-25 RX ADMIN — IOHEXOL 100 ML: 350 INJECTION, SOLUTION INTRAVENOUS at 19:57

## 2022-10-25 RX ADMIN — NICOTINE 1 PATCH: 21 PATCH, EXTENDED RELEASE TRANSDERMAL at 23:16

## 2022-10-25 RX ADMIN — ASPIRIN 325 MG ORAL TABLET 325 MG: 325 PILL ORAL at 23:16

## 2022-10-25 NOTE — Clinical Note
Case was discussed with Erica Bacon and the patient's admission status was agreed to be Admission Status: observation status to the service of Dr Jerry Stoner

## 2022-10-25 NOTE — ED PROVIDER NOTES
History  Chief Complaint   Patient presents with   • Dizziness     Dizziness that comes and goes, worse with movement, for 1 week  Worsening lightheaded and headache X 1 week  Numbness to left side of face and family reports slurred speech that lasts a few seconds--started last Thursday, has not had any episodes today  HPI  47 yo F presents with multiple symptoms  She reports that for the last week she has had intermittent dizziness with headache, numbness to left side of face and slurred speech lasting a few seconds at a time  She reports that symptoms are currently resolved  States that headache was present behind left eye and radiated across the top of her head  States that she has had headaches in the past and is prescribed magnesium  No fevers, neck stiffness, trauma, weakness, difficulty walking  None       Past Medical History:   Diagnosis Date   • Depression        History reviewed  No pertinent surgical history  History reviewed  No pertinent family history  I have reviewed and agree with the history as documented  E-Cigarette/Vaping     E-Cigarette/Vaping Substances     Social History     Tobacco Use   • Smoking status: Current Every Day Smoker     Packs/day: 1 50   • Smokeless tobacco: Never Used   Substance Use Topics   • Alcohol use: Never   • Drug use: Never       Review of Systems   Constitutional: Negative for chills and fever  HENT: Negative for dental problem and ear pain  Eyes: Negative for pain and redness  Respiratory: Negative for cough and shortness of breath  Cardiovascular: Negative for chest pain and palpitations  Gastrointestinal: Negative for abdominal pain and nausea  Endocrine: Negative for polydipsia and polyphagia  Genitourinary: Negative for dysuria and frequency  Musculoskeletal: Negative for arthralgias and joint swelling  Skin: Negative for color change and rash     Neurological: Positive for dizziness, speech difficulty, light-headedness, numbness and headaches  Psychiatric/Behavioral: Negative for behavioral problems and confusion  All other systems reviewed and are negative  Physical Exam  Physical Exam  Vitals and nursing note reviewed  Constitutional:       General: She is not in acute distress  Appearance: She is well-developed  She is not diaphoretic  HENT:      Head: Atraumatic  Right Ear: External ear normal       Left Ear: External ear normal       Nose: Nose normal    Eyes:      Conjunctiva/sclera: Conjunctivae normal       Pupils: Pupils are equal, round, and reactive to light  Neck:      Vascular: No JVD  Cardiovascular:      Rate and Rhythm: Normal rate and regular rhythm  Heart sounds: Normal heart sounds  No murmur heard  Pulmonary:      Effort: Pulmonary effort is normal  No respiratory distress  Breath sounds: Normal breath sounds  No wheezing  Abdominal:      General: Bowel sounds are normal  There is no distension  Palpations: Abdomen is soft  Tenderness: There is no abdominal tenderness  Musculoskeletal:         General: Normal range of motion  Cervical back: Normal range of motion and neck supple  Skin:     General: Skin is warm and dry  Capillary Refill: Capillary refill takes less than 2 seconds  Neurological:      Mental Status: She is alert and oriented to person, place, and time  Cranial Nerves: No cranial nerve deficit  Comments: CN II-XII intact grossly, no focal deficits  Normal strength and sensation in b/l upper and lower extremities   Normal FNF and rapid alternating hand movements   Psychiatric:         Behavior: Behavior normal          Vital Signs  ED Triage Vitals [10/25/22 1816]   Temperature Pulse Respirations Blood Pressure SpO2   98 9 °F (37 2 °C) 83 16 110/74 98 %      Temp Source Heart Rate Source Patient Position - Orthostatic VS BP Location FiO2 (%)   Oral Monitor Sitting Left arm --      Pain Score       --           Vitals:    10/25/22 1816 10/25/22 1901 10/25/22 2014 10/25/22 2116   BP: 110/74  112/57 105/54   Pulse: 83 67 67 65   Patient Position - Orthostatic VS: Sitting   Lying         Visual Acuity  Visual Acuity    Flowsheet Row Most Recent Value   L Pupil Size (mm) 3   R Pupil Size (mm) 3          ED Medications  Medications   sodium chloride 0 9 % bolus 1,000 mL (0 mL Intravenous Stopped 10/25/22 2119)   iohexol (OMNIPAQUE) 350 MG/ML injection (SINGLE-DOSE) 100 mL (100 mL Intravenous Given 10/25/22 1957)       Diagnostic Studies  Results Reviewed     Procedure Component Value Units Date/Time    HS Troponin 0hr (reflex protocol) [[id number]]  (Normal) Collected: 10/25/22 1859    Lab Status: Final result Specimen: Blood from Arm, Right Updated: 10/25/22 1933     hs TnI 0hr <2 ng/L     Pregnancy Test (HCG Qualitative) [[id number]]  (Normal) Collected: 10/25/22 1859    Lab Status: Final result Specimen: Blood from Arm, Right Updated: 10/25/22 1931     Preg, Serum Negative    Comprehensive metabolic panel [[id number]]  (Abnormal) Collected: 10/25/22 1859    Lab Status: Final result Specimen: Blood from Arm, Right Updated: 10/25/22 1925     Sodium 139 mmol/L      Potassium 4 0 mmol/L      Chloride 104 mmol/L      CO2 27 mmol/L      ANION GAP 8 mmol/L      BUN 16 mg/dL      Creatinine 0 84 mg/dL      Glucose 91 mg/dL      Calcium 8 9 mg/dL      AST 19 U/L      ALT 18 U/L      Alkaline Phosphatase 42 U/L      Total Protein 7 4 g/dL      Albumin 3 8 g/dL      Total Bilirubin 0 41 mg/dL      eGFR 87 ml/min/1 73sq m     Narrative:      Betty guidelines for Chronic Kidney Disease (CKD):   •  Stage 1 with normal or high GFR (GFR > 90 mL/min/1 73 square meters)  •  Stage 2 Mild CKD (GFR = 60-89 mL/min/1 73 square meters)  •  Stage 3A Moderate CKD (GFR = 45-59 mL/min/1 73 square meters)  •  Stage 3B Moderate CKD (GFR = 30-44 mL/min/1 73 square meters)  •  Stage 4 Severe CKD (GFR = 15-29 mL/min/1 73 square meters)  •  Stage 5 End Stage CKD (GFR <15 mL/min/1 73 square meters)  Note: GFR calculation is accurate only with a steady state creatinine    CBC and differential [[id number]]  (Abnormal) Collected: 10/25/22 1859    Lab Status: Final result Specimen: Blood from Arm, Right Updated: 10/25/22 1908     WBC 10 56 Thousand/uL      RBC 4 70 Million/uL      Hemoglobin 15 1 g/dL      Hematocrit 45 0 %      MCV 96 fL      MCH 32 1 pg      MCHC 33 6 g/dL      RDW 12 1 %      MPV 10 3 fL      Platelets 714 Thousands/uL      nRBC 0 /100 WBCs      Neutrophils Relative 64 %      Immat GRANS % 0 %      Lymphocytes Relative 30 %      Monocytes Relative 4 %      Eosinophils Relative 1 %      Basophils Relative 1 %      Neutrophils Absolute 6 72 Thousands/µL      Immature Grans Absolute 0 03 Thousand/uL      Lymphocytes Absolute 3 19 Thousands/µL      Monocytes Absolute 0 43 Thousand/µL      Eosinophils Absolute 0 14 Thousand/µL      Basophils Absolute 0 05 Thousands/µL                  CTA head and neck with and without contrast   Final Result by Lyndsay Everett MD (10/25 2213)         1  No evidence of acute infarct, intracranial hemorrhage or mass  2   No stenosis, dissection or occlusion of the carotid or vertebral arteries or major vessels of the [place]  Workstation performed: [id number]                    Procedures  Procedures         ED Course                               SBIRT 20yo+    Flowsheet Row Most Recent Value   SBIRT (25 yo +)    In order to provide better care to our patients, we are screening all of our patients for alcohol and drug use  Would it be okay to ask you these screening questions? Yes Filed at: 10/25/2022 1901   Initial Alcohol Screen: US AUDIT-C     1  How often do you have a drink containing alcohol? 0 Filed at: 10/25/2022 1901   2  How many drinks containing alcohol do you have on a typical day you are drinking? 0 Filed at: 10/25/2022 1901   3a  Male UNDER 65:  How often do you have five or more drinks on one occasion? 0 Filed at: 10/25/2022 1901   3b  FEMALE Any Age, or MALE 65+: How often do you have 4 or more drinks on one occassion? 0 Filed at: 10/25/2022 1901   Audit-C Score 0 Filed at: 10/25/2022 1901   THEO: How many times in the past year have you    Used an illegal drug or used a prescription medication for non-medical reasons? Never Filed at: 10/25/2022 1901                    [facility]  Patient presents with left sided numbness, slurred speech, headache and dizziness which have been intermittent for past week  CTA head/neck negative, labs unremarkable  Not thrombolytic candidate as symptoms have resolved  Unsure TIA vs complex migraine  Will admit for further workup  Disposition  Final diagnoses:   Facial numbness   Slurred speech   Headache   Lightheadedness     Time reflects when diagnosis was documented in both MDM as applicable and the Disposition within this note     Time User Action Codes Description Comment    10/25/2022 10:27 PM Vincent Sleigh Add [R20 0] Facial numbness     10/25/2022 10:27 PM Vincent Sleigh Add [R47 81] Slurred speech     10/25/2022 10:27 PM Vincent Sleigh Add [R51 9] Headache     10/25/2022 10:27 PM Vincent Sleigh Add [R42] [facility]       ED Disposition     ED Disposition   Admit    Condition   Stable    Date/Time   Tue Oct 25, 2022 10:33 PM    Comment   Case was discussed with Analilia Jessica and the patient's admission status was agreed to be Admission Status: observation status to the service of Dr Jessica Arevalo   Follow-up Information    None         Patient's Medications    No medications on file       No discharge procedures on file      PDMP Review     None          ED Provider  Electronically Signed by           Lg Mendoza MD  10/25/22 6950

## 2022-10-26 ENCOUNTER — APPOINTMENT (OUTPATIENT)
Dept: MRI IMAGING | Facility: HOSPITAL | Age: 42
End: 2022-10-26
Payer: COMMERCIAL

## 2022-10-26 ENCOUNTER — APPOINTMENT (OUTPATIENT)
Dept: NON INVASIVE DIAGNOSTICS | Facility: HOSPITAL | Age: 42
End: 2022-10-26
Payer: COMMERCIAL

## 2022-10-26 VITALS
SYSTOLIC BLOOD PRESSURE: 104 MMHG | BODY MASS INDEX: 32.2 KG/M2 | WEIGHT: 175 LBS | DIASTOLIC BLOOD PRESSURE: 54 MMHG | HEART RATE: 78 BPM | OXYGEN SATURATION: 97 % | HEIGHT: 62 IN | RESPIRATION RATE: 20 BRPM | TEMPERATURE: 97.6 F

## 2022-10-26 PROBLEM — R42 VERTIGO: Status: ACTIVE | Noted: 2022-10-26

## 2022-10-26 PROBLEM — G43.909 MIGRAINE: Status: ACTIVE | Noted: 2022-10-26

## 2022-10-26 LAB
AORTIC ROOT: 2.9 CM
APICAL FOUR CHAMBER EJECTION FRACTION: 69 %
ASCENDING AORTA: 3 CM
CHOLEST SERPL-MCNC: 133 MG/DL
E WAVE DECELERATION TIME: 205 MS
EST. AVERAGE GLUCOSE BLD GHB EST-MCNC: 94 MG/DL
FRACTIONAL SHORTENING: 32 % (ref 28–44)
HBA1C MFR BLD: 4.9 %
HDLC SERPL-MCNC: 47 MG/DL
INTERVENTRICULAR SEPTUM IN DIASTOLE (PARASTERNAL SHORT AXIS VIEW): 0.9 CM
INTERVENTRICULAR SEPTUM: 0.9 CM (ref 0.6–1.1)
LAAS-AP2: 13.2 CM2
LAAS-AP4: 14.5 CM2
LDLC SERPL CALC-MCNC: 72 MG/DL (ref 0–100)
LEFT ATRIUM AREA SYSTOLE SINGLE PLANE A4C: 14.4 CM2
LEFT ATRIUM SIZE: 3.3 CM
LEFT INTERNAL DIMENSION IN SYSTOLE: 3.2 CM (ref 2.1–4)
LEFT VENTRICULAR INTERNAL DIMENSION IN DIASTOLE: 4.7 CM (ref 3.5–6)
LEFT VENTRICULAR POSTERIOR WALL IN END DIASTOLE: 0.8 CM
LEFT VENTRICULAR STROKE VOLUME: 59 ML
LVSV (TEICH): 59 ML
MV E'TISSUE VEL-SEP: 11 CM/S
MV PEAK A VEL: 0.64 M/S
MV PEAK E VEL: 82 CM/S
MV STENOSIS PRESSURE HALF TIME: 59 MS
MV VALVE AREA P 1/2 METHOD: 3.73 CM2
RIGHT ATRIUM AREA SYSTOLE A4C: 16.6 CM2
RIGHT VENTRICLE ID DIMENSION: 3.7 CM
SL CV LEFT ATRIUM LENGTH A2C: 4.4 CM
SL CV LV EF: 60
SL CV PED ECHO LEFT VENTRICLE DIASTOLIC VOLUME (MOD BIPLANE) 2D: 100 ML
SL CV PED ECHO LEFT VENTRICLE SYSTOLIC VOLUME (MOD BIPLANE) 2D: 41 ML
TR MAX PG: 16 MMHG
TR PEAK VELOCITY: 2 M/S
TRICUSPID VALVE PEAK REGURGITATION VELOCITY: 1.99 M/S
TRIGL SERPL-MCNC: 72 MG/DL

## 2022-10-26 PROCEDURE — 70551 MRI BRAIN STEM W/O DYE: CPT

## 2022-10-26 PROCEDURE — 36415 COLL VENOUS BLD VENIPUNCTURE: CPT

## 2022-10-26 PROCEDURE — 83036 HEMOGLOBIN GLYCOSYLATED A1C: CPT

## 2022-10-26 PROCEDURE — G1004 CDSM NDSC: HCPCS

## 2022-10-26 PROCEDURE — 93306 TTE W/DOPPLER COMPLETE: CPT | Performed by: INTERNAL MEDICINE

## 2022-10-26 PROCEDURE — 80061 LIPID PANEL: CPT

## 2022-10-26 PROCEDURE — 93306 TTE W/DOPPLER COMPLETE: CPT

## 2022-10-26 PROCEDURE — 99217 PR OBSERVATION CARE DISCHARGE MANAGEMENT: CPT | Performed by: INTERNAL MEDICINE

## 2022-10-26 PROCEDURE — NC001 PR NO CHARGE: Performed by: INTERNAL MEDICINE

## 2022-10-26 PROCEDURE — 70544 MR ANGIOGRAPHY HEAD W/O DYE: CPT

## 2022-10-26 RX ORDER — ASPIRIN 81 MG/1
81 TABLET, CHEWABLE ORAL DAILY
Qty: 30 TABLET | Refills: 0 | Status: SHIPPED | OUTPATIENT
Start: 2022-10-27 | End: 2022-11-26

## 2022-10-26 RX ADMIN — ASPIRIN 81 MG: 81 TABLET, CHEWABLE ORAL at 08:58

## 2022-10-26 RX ADMIN — NICOTINE 1 PATCH: 21 PATCH, EXTENDED RELEASE TRANSDERMAL at 11:24

## 2022-10-26 NOTE — ASSESSMENT & PLAN NOTE
· Patient with history of migraines  · Patient also admits to ongoing headaches for the past couple of days    Patient did have headache prior to symptom onset  · Likely complex migraine  · Continue to monitor

## 2022-10-26 NOTE — ED NOTES
Pt reports that she took the nicotine patch that was applied last night off  This nurse disposed of the nicotine patch at this time       Rafi Olivier RN  10/26/22 8422

## 2022-10-26 NOTE — ASSESSMENT & PLAN NOTE
Patient reporting intermittent dizziness and headache (behind left eye into top of head) for the past week  There is associated left sided facial numbness and family reports intermittent slurred speech during these episodes as well  She is currently without symptom or complaint      /54 (BP Location: Left arm)   Pulse 65   Temp 98 9 °F (37 2 °C) (Oral)   Resp 18   Ht 5' 2" (1 575 m)   Wt 79 4 kg (175 lb)   SpO2 97%   BMI 32 01 kg/m²     · Reports intermittent headache (behind left eye into top of head) for the past week  · Reports hx of migraines; but reports this headache is different  · There is associated dizziness, left sided facial numbness, and family reports intermittent slurred speech during these episodes as well  · The dizziness is worse with head movement when present   · Patient is currently without complaint  · Neuro exam w/o focal deficit at this time  · CTA head/neck w/o acute finding  · Not hypoglycemic; no major electrolyte abnormalities  · TIA vs complex migraine  · Will follow stroke protocol overnight  · ECHO and MRI ordered  · Daily ASA and atorvastatin  · Neuro checks and permissive HTN per stroke protocol  · Neurology consulted

## 2022-10-26 NOTE — ASSESSMENT & PLAN NOTE
· Patient's symptoms seem to be consistent with a complex migraine  · Reports intermittent headache (behind left eye into top of head) for the past week  · There is associated dizziness, left sided facial numbness  · The dizziness is worse with head movement when present and patient does state she has history of vertigo  · Patient is currently without complaint  · CTA head/neck w/o acute finding  · Follow-up echocardiogram and MRI  · Daily ASA and atorvastatin  · Neurology following

## 2022-10-26 NOTE — ED NOTES
Assumed care of this patient, at this time the patient is resting, no distress noted, call bell within reach and will continue to monitor       Janette Olivier RN  10/26/22 3839

## 2022-10-26 NOTE — DISCHARGE SUMMARY
[facility]  Discharge- Sierra Meyers 1980, 46 y o  female MRN: [id number]  Unit/Bed#: ED 14 Encounter: [id number]  Primary Care Provider: No primary care provider on file  Date and time admitted to hospital: 10/25/2022  6:35 PM    * Stroke-like symptoms  Assessment & Plan  · Patient's symptoms seem to be consistent with a complex migraine  · Reports intermittent headache (behind left eye into top of head) for the past week  · No further recurrence of symptoms  · The dizziness is worse with head movement when present and patient does state she has history of vertigo  · Patient is currently without complaint  · CTA head/neck w/o acute finding  · Echocardiogram-EF 60% and wall motion normal  · MRI brain-unremarkable   · Daily ASA and atorvastatin  · Neurology following     Vertigo  Assessment & Plan  · Stable at this time    Migraine  Assessment & Plan  · Patient with history of migraines  · Patient also admits to ongoing headaches for the past couple of days  Patient did have headache prior to symptom onset  · Likely complex migraine    Tobacco abuse  Assessment & Plan  · Recommend complete cessation  · Nicotine patch ordered       Medical Problems             Resolved Problems  Date Reviewed: 10/25/2022   None               Discharging Physician / Practitioner: Bety Matamoros  PCP: No primary care provider on file  Admission Date:   Admission Orders (From admission, onward)     Ordered        10/25/22 2234  Place in Observation  Once                      Discharge Date: 10/26/22    Consultations During Hospital Stay:  · Neurology    Complications:  none    Reason for Admission:  Facial numbness    Hospital Course:   Sierra Meyers is a 46 y o  female patient who originally presented to the hospital on 10/25/2022 due to facial numbness  Patient was seen by Neurology team and underwent MRI which was unremarkable  Patient's symptoms likely secondary to complex migraine    Patient should follow-up with primary care provider as an outpatient  Please see above list of diagnoses and related plan for additional information  Condition at Discharge: stable    Discharge Day Visit / Exam:   * Please refer to separate progress note for these details *    Discussion with Family: Patient declined call to   Discharge instructions/Information to patient and family:   See after visit summary for information provided to patient and family  Provisions for Follow-Up Care:  See after visit summary for information related to follow-up care and any pertinent home health orders  Disposition:   Home    Planned Readmission: none     Discharge Statement:  I spent 35 minutes discharging the patient  This time was spent on the day of discharge  I had direct contact with the patient on the day of discharge  Greater than 50% of the total time was spent examining patient, answering all patient questions, arranging and discussing plan of care with patient as well as directly providing post-discharge instructions  Additional time then spent on discharge activities  Discharge Medications:  See after visit summary for reconciled discharge medications provided to patient and/or family        **Please Note: This note may have been constructed using a voice recognition system**

## 2022-10-26 NOTE — ASSESSMENT & PLAN NOTE
· Patient's symptoms seem to be consistent with a complex migraine  · Reports intermittent headache (behind left eye into top of head) for the past week  · No further recurrence of symptoms  · The dizziness is worse with head movement when present and patient does state she has history of vertigo  · Patient is currently without complaint  · CTA head/neck w/o acute finding  · Echocardiogram-EF 60% and wall motion normal  · MRI brain-unremarkable   · Daily ASA and atorvastatin  · Neurology following

## 2022-10-26 NOTE — ASSESSMENT & PLAN NOTE
46year old female with depression, tobacco abuse, history of DVT (unclear cause), history of cocaine use who presented to the hospital with complaints of 3 day history of intermittent HA and dizziness as well as L facial numbness  Patient reports her family also noted slurred speech with these episodes  Patient reports that symptoms are completely resolved at this time  Suspect symptoms secondary to complex migraine, but cannot entirely exclude stroke or dural venous sinus thrombosis given patient's history of unprovoked DVT in the past      Plan:   - Recommend continue on acute ischemic stroke pathway  - MRI brain without contrast pending    - Recommend check MRV head to rule out dural sinus thrombosis  - Echocardiogram pending    - Continue on ASA 81 mg QD and atorvastatin 40 mg QPM    - Will hold off on any headache treatment at this time as patient reports she is asymptomatic currently  - Goal normotension, normothermia and euglycemia    - PT/OT  - Stroke education    - Monitor exam and notify with changes

## 2022-10-26 NOTE — CONSULTS
Consultation - Neurology   Latoya Morrison 46 y o  female MRN: [id number]  Unit/Bed#: ED 14 Encounter: [id number]      Assessment/Plan     * Stroke-like symptoms  Assessment & Plan  46year old female with depression, tobacco abuse, history of DVT (unclear cause), history of cocaine use who presented to the hospital with complaints of 3 day history of intermittent HA and dizziness as well as L facial numbness  Patient reports her family also noted slurred speech with these episodes  Patient reports that symptoms are completely resolved at this time  Suspect symptoms secondary to complex migraine, but cannot entirely exclude stroke or dural venous sinus thrombosis given patient's history of unprovoked DVT in the past      Plan:   - Recommend continue on acute ischemic stroke pathway  - MRI brain without contrast pending    - Recommend check MRV head to rule out dural sinus thrombosis  - Echocardiogram pending    - Continue on ASA 81 mg QD and atorvastatin 40 mg QPM    - Will hold off on any headache treatment at this time as patient reports she is asymptomatic currently  - Goal normotension, normothermia and euglycemia    - PT/OT  - Stroke education    - Monitor exam and notify with changes  Migraine  Assessment & Plan  - Patient follows with neurology team as an outpatient and has an appointment with a new provider on November 7, 2022    - Continue with Magnesium oxide as an outpatient as patient reports this is typically effective for her  Tobacco abuse  Assessment & Plan  - Encourage tobacco cessation  Recommendations for outpatient neurological follow up have yet to be determined  History of Present Illness     Reason for Consult / Principal Problem: Stroke-like symptoms  HPI: Latoya Morrison is a 46 y o   female with depression, tobacco abuse, history of DVT, history of drug use who presents with 3 day history of headache   She notes that headache was present intermittently over the past 3 days and associated with photophobia  She notes that she took magnesium oxide and this was effective for her but yesterday after work, she went to the grocery store and she forgot what she was there for  She notes that she felt dizzy at the time and also had numbness of her L side of her face  She notes that she felt like her face was drooped at the time, but when she looked in the mirror her face was symmetric  Per patient, her family note that her speech was also slurred  She notes that she has had headaches in the past and follows with a neurologist in the outpatient setting  To review, patient was seen previously by neurology team at [facility] and was seen in March 2020 per chart review  She was seen for follow-up of "jerking" movements of L arm and intermittent headaches related to multiple head injuries  At that time, differential diagnosis for jerking movements was felt to be myoclonic jerks vs Tourette syndrome and possibly related to prior long term cocaine use  MRI brain and cervical spine was ordered  She was continued on Magnesium for headaches which was effective  Unfortunately MRI brain was denied by insurance  Patient was seen in November 2020 via telemedicine and at that time had not completed the MRI cervical spine and not completed a sleep study which had been ordered  At that visit, she was recommended to complete the MRI cervical spine and also sleep study  She was continued on magnesium for headaches  She was again seen in March 2019 ad June 2019 and at that time was continued on Magnesium and also instructed to take Excedrin Migraine for abortive therapy  There was note of prior history of papilledema and concern for pseudotumor cerebri but her most recent LP in 2018 has a normal opening pressure and neurologist felt her history and findings were not consistent with pseudotumor cerebri   She was seen again in follow-up in October 2019 with concern for worsening jerking movements and was again instructed to have EEG and sleep study performed  She was also recommended to see psychiatry for complaints of anxiety  She underwent EEG in October 2019 and this was reportedly normal     Patient also has a history of LE DVT which was unprovoked  Patient was not on OCPs at that time but did have Mirena IUD and was not immobilized  Patient reports she smokes tobacco daily, she uses marijuana and also drinks socially  She notes she previously used cocaine but notes it has been "months" since she last used  Inpatient consult to Neurology  Consult performed by: Ty Chery PA-C  Consult ordered by: Heron Hodge PA-C          Review of Systems   Constitutional: Negative for chills, fatigue and fever  HENT: Negative for trouble swallowing  Eyes: Negative for photophobia and visual disturbance  Respiratory: Negative for shortness of breath  Cardiovascular: Negative for chest pain  Gastrointestinal: Negative for abdominal pain, nausea and vomiting  Genitourinary: Negative for difficulty urinating and dysuria  Musculoskeletal: Negative for back pain, gait problem and neck stiffness  Skin: Negative for rash  Neurological: Negative for dizziness, syncope, facial asymmetry, speech difficulty, weakness, light-headedness, numbness and headaches  Psychiatric/Behavioral: Negative for confusion  Historical Information   Past Medical History:   Diagnosis Date   • Depression      History reviewed  No pertinent surgical history  Social History   Social History     Substance and Sexual Activity   Alcohol Use Never     Social History     Substance and Sexual Activity   Drug Use Never     E-Cigarette/Vaping     E-Cigarette/Vaping Substances     Social History     Tobacco Use   Smoking Status Current Every Day Smoker   • Packs/day: 1 50   Smokeless Tobacco Never Used     Family History: History reviewed  No pertinent family history  Review of previous medical records was completed   Per review of chart, patient recently seen by [facility] physiatry for low back pain and underwent EMG/NCS which was normal     Meds/Allergies   Scheduled Meds:  Current Facility-Administered Medications   Medication Dose Route Frequency Provider Last Rate   • aspirin  81 mg Oral Daily Brian Steiner PA-C     • atorvastatin  40 mg Oral QPM Brian Steiner PA-C     • enoxaparin  40 mg Subcutaneous [facility]     • nicotine  1 patch Transdermal Daily Brian Steiner PA-C       Continuous Infusions:   PRN Meds:  Allergies   Allergen Reactions   • Nickel Rash       Objective   Vitals:Blood pressure 105/57, pulse 67, temperature 97 6 °F (36 4 °C), temperature source Oral, resp  rate 17, height 5' 2" (1 575 m), weight 79 4 kg (175 lb), SpO2 98 %  ,Body mass index is 32 01 kg/m²  Intake/Output Summary (Last 24 hours) at 10/26/2022 0814  Last data filed at 10/25/2022 2119  Gross per 24 hour   Intake 1000 ml   Output --   Net 1000 ml       Invasive Devices: Invasive Devices  Report    Peripheral Intravenous Line  Duration           Peripheral IV 10/26/22 Right Hand <1 day                Physical Exam  Constitutional:       General: She is not in acute distress  Appearance: Normal appearance  She is not ill-appearing, toxic-appearing or diaphoretic  HENT:      Head: Normocephalic and atraumatic  Mouth/Throat:      Mouth: Mucous membranes are moist       Pharynx: Oropharynx is clear  No oropharyngeal exudate or posterior oropharyngeal erythema  Comments: Poor dentition  Eyes:      Extraocular Movements: Extraocular movements intact  Conjunctiva/sclera: Conjunctivae normal       Pupils: Pupils are equal, round, and reactive to light  Cardiovascular:      Rate and Rhythm: Normal rate and regular rhythm  Pulses: Normal pulses  Heart sounds: Normal heart sounds  Pulmonary:      Effort: Pulmonary effort is normal  No respiratory distress  Breath sounds: Normal breath sounds  Abdominal:      General: There is no distension  Palpations: Abdomen is soft  Tenderness: There is no abdominal tenderness  Musculoskeletal:         General: Normal range of motion  Cervical back: Normal range of motion and neck supple  Right lower leg: No edema  Left lower leg: No edema  Skin:     General: Skin is warm and dry  Findings: No erythema or rash  Neurological:      Mental Status: She is alert and oriented to person, place, and time  Coordination: Finger-Nose-Finger Test and Heel to [place] omshe Test normal       Deep Tendon Reflexes:      Reflex Scores:       Bicep reflexes are 2+ on the right side and 2+ on the left side  Brachioradialis reflexes are 2+ on the right side and 2+ on the left side  Patellar reflexes are 2+ on the right side and 2+ on the left side  Achilles reflexes are 2+ on the right side and 2+ on the left side  Psychiatric:         Mood and Affect: Mood normal          Speech: Speech normal          Behavior: Behavior normal        Neurologic Exam     Mental Status   Oriented to person, place, and time  Oriented to person  Oriented to place  Oriented to time  Attention: normal  Concentration: normal    Speech: speech is normal   Level of consciousness: alert  Knowledge: good  Able to name object  Able to repeat  Normal comprehension  Cranial Nerves     CN II   Right visual field deficit: none  Left visual field deficit: none     CN III, IV, VI   Pupils are equal, round, and reactive to light  Right pupil: Size: 4 mm  Shape: regular  Reactivity: brisk  Consensual response: intact  Left pupil: Size: 4 mm  Shape: regular  Reactivity: brisk  Consensual response: intact       CN V   Right facial sensation deficit: none  Left facial sensation deficit: none    CN VII   Right facial weakness: none  Left facial weakness: none    Motor Exam   Muscle bulk: normal  Overall muscle tone: normal  Right arm tone: normal  Left arm tone: normal  Right arm pronator drift: absent  Left arm pronator drift: absent  Right leg tone: normal  Left leg tone: normalMotor strength 5/5 B/L UE and LE       Sensory Exam   Right arm light touch: normal  Left arm light touch: normal  Right leg light touch: normal  Left leg light touch: normal  Right arm vibration: normal  Left arm vibration: normal  Right leg vibration: normal  Left leg vibration: normal    Gait, Coordination, and Reflexes     Gait  Gait: (Gait deferred for safety )    Coordination   Finger to nose coordination: normal  Heel to shin coordination: normal    Tremor   Resting tremor: absent  Intention tremor: absent  Action tremor: absent    Reflexes   Right brachioradialis: 2+  Left brachioradialis: 2+  Right biceps: 2+  Left biceps: 2+  Right patellar: 2+  Left patellar: 2+  Right achilles: 2+  Left achilles: 2+  Right plantar: normal  Left plantar: normal      Lab Results:   Recent Results (from the past 24 hour(s))   ECG 12 lead    Collection Time: 10/25/22  6:19 PM   Result Value Ref Range    Ventricular Rate 72 BPM    Atrial Rate 72 BPM    PA Interval 146 ms    QRSD Interval 78 ms    QT Interval 390 ms    QTC Interval 427 ms    P [place] 72 degrees    QRS Axis 64 degrees    T Wave Axis 71 degrees   CBC and differential    Collection Time: 10/25/22  6:59 PM   Result Value Ref Range    WBC 10 56 (H) 4 31 - 10 16 Thousand/uL    RBC 4 70 3 81 - 5 12 Million/uL    Hemoglobin 15 1 11 5 - 15 4 g/dL    Hematocrit 45 0 34 8 - 46 1 %    MCV 96 82 - 98 fL    MCH 32 1 26 8 - 34 3 pg    MCHC 33 6 31 4 - 37 4 g/dL    RDW 12 1 11 6 - 15 1 %    MPV 10 3 8 9 - 12 7 fL    Platelets 110 177 - 539 Thousands/uL    nRBC 0 /100 WBCs    Neutrophils Relative 64 43 - 75 %    Immat GRANS % 0 0 - 2 %    Lymphocytes Relative 30 14 - 44 %    Monocytes Relative 4 4 - 12 %    Eosinophils Relative 1 0 - 6 %    Basophils Relative 1 0 - 1 %    Neutrophils Absolute 6 72 1 85 - 7 62 Thousands/µL    Immature Grans Absolute 0 03 0 00 - 0 20 Thousand/uL    Lymphocytes Absolute 3 19 0 60 - 4 47 Thousands/µL    Monocytes Absolute 0 43 0 17 - 1 22 Thousand/µL    Eosinophils Absolute 0 14 0 00 - 0 61 Thousand/µL    Basophils Absolute 0 05 0 00 - 0 10 Thousands/µL   Comprehensive metabolic panel    Collection Time: 10/25/22  6:59 PM   Result Value Ref Range    Sodium 139 135 - 147 mmol/L    Potassium 4 0 3 5 - 5 3 mmol/L    Chloride 104 96 - 108 mmol/L    CO2 27 21 - 32 mmol/L    ANION GAP 8 4 - 13 mmol/L    BUN 16 5 - 25 mg/dL    Creatinine 0 84 0 60 - 1 30 mg/dL    Glucose 91 65 - 140 mg/dL    Calcium 8 9 8 3 - 10 1 mg/dL    AST 19 5 - 45 U/L    ALT 18 12 - 78 U/L    Alkaline Phosphatase 42 (L) 46 - 116 U/L    Total Protein 7 4 6 4 - 8 4 g/dL    Albumin 3 8 3 5 - 5 0 g/dL    Total Bilirubin 0 41 0 20 - 1 00 mg/dL    eGFR 87 ml/min/1 73sq m   Pregnancy Test (HCG Qualitative)    Collection Time: 10/25/22  6:59 PM   Result Value Ref Range    Preg, Serum Negative Negative   HS Troponin 0hr (reflex protocol)    Collection Time: 10/25/22  6:59 PM   Result Value Ref Range    hs TnI 0hr <2 "Refer to ACS Flowchart"- see link ng/L   Lipid Panel with Direct LDL reflex    Collection Time: 10/26/22  4:43 AM   Result Value Ref Range    Cholesterol 133 See Comment mg/dL    Triglycerides 72 See Comment mg/dL    HDL, Direct 47 (L) >=50 mg/dL    LDL Calculated 72 0 - 100 mg/dL   Echo complete w/ contrast if indicated    Collection Time: 10/26/22  7:40 AM   Result Value Ref Range    LA size 3 3 cm    Triscuspid Valve Regurgitation Peak Gradient 16 0 mmHg    Tricuspid valve peak regurgitation velocity 1 99 m/s    LVPWd 0 80 cm    Left Atrium Area-systolic Apical Two Chamber 13 2 cm2    Left Atrium Area-systolic Four Chamber 01 1 cm2    MV E' Tissue Velocity Septal 11 cm/s    TR Peak Joaquin 2 0 m/s    IVSd 5 05 cm    LV DIASTOLIC VOLUME (MOD BIPLANE) 2D 100 mL    LEFT VENTRICLE SYSTOLIC VOLUME (MOD BIPLANE) 2D 41 mL    Left ventricular stroke volume (2D) 59 00 mL A4C EF 69 %    LA length (A2C) 4 40 cm    LVIDd 4 70 cm    IVS 0 9 cm    LVIDS 3 20 cm    FS 32 28 - 44 %    Asc Ao 3 cm    Ao root 2 90 cm    RVID d 3 7 cm    MV valve area p 1/2 method 3 73 cm2    E wave deceleration time 205 ms    MV Peak E Joaquin 82 cm/s    MV Peak A Joaquin 0 64 m/s    GERBER A4C 14 4 cm2    RAA A4C 16 6 cm2    MV stenosis pressure 1/2 time 59 ms    LVSV, 2D 59 mL       Imaging Studies: I have personally reviewed pertinent reports  and I have personally reviewed pertinent films in PACS  CTA head and neck with and without contrast- No evidence of acute infarct, intracranial hemorrhage or mass  No stenosis, dissection, or occlusion of the carotid of vertebral arteries or major vessels of the [place]       VTE Prophylaxis: Enoxaparin (Lovenox)

## 2022-10-26 NOTE — ASSESSMENT & PLAN NOTE
· Patient with history of migraines  · Patient also admits to ongoing headaches for the past couple of days    Patient did have headache prior to symptom onset  · Likely complex migraine

## 2022-10-26 NOTE — PROGRESS NOTES
[facility] [facility]  Progress Note - [facility] 1980, 46 y o  female MRN: [id number]  Unit/Bed#: ED 14 Encounter: [id number]  Primary Care Provider: No primary care provider on file  Date and time admitted to hospital: 10/25/2022  6:35 PM    * Stroke-like symptoms  Assessment & Plan  · Patient's symptoms seem to be consistent with a complex migraine  · Reports intermittent headache (behind left eye into top of head) for the past week  · There is associated dizziness, left sided facial numbness  · The dizziness is worse with head movement when present and patient does state she has history of vertigo  · Patient is currently without complaint  · CTA head/neck w/o acute finding  · Follow-up echocardiogram and MRI  · Daily ASA and atorvastatin  · Neurology following     Vertigo  Assessment & Plan  · Stable at this time    Migraine  Assessment & Plan  · Patient with history of migraines  · Patient also admits to ongoing headaches for the past couple of days  Patient did have headache prior to symptom onset  · Likely complex migraine  · Continue to monitor    Tobacco abuse  Assessment & Plan  · Recommend complete cessation  · Nicotine patch ordered         VTE Pharmacologic Prophylaxis: VTE Score: 6 Moderate Risk (Score 3-4) - Pharmacological DVT Prophylaxis Ordered: enoxaparin (Lovenox)  Patient Centered Rounds: I performed bedside rounds with nursing staff today  Discussions with Specialists or Other Care Team Provider:  Neurology, case management    Education and Discussions with Family / Patient: Updated  () at bedside  Time Spent for Care: 45 minutes  More than 50% of total time spent on counseling and coordination of care as described above  Current Length of Stay: 0 day(s)  Current Patient Status: Observation   Certification Statement: Continued hospitalization for MRI and neurologic workup  Discharge Plan: Anticipate discharge tomorrow to home      Code Status: Level 1 - Full Code    Subjective:   Patient resting comfortably on examination  Patient had no overnight events or complaints on exam     Objective:     Vitals:   Temp (24hrs), Av 3 °F (36 8 °C), Min:97 6 °F (36 4 °C), Max:98 9 °F (37 2 °C)    Temp:  [97 6 °F (36 4 °C)-98 9 °F (37 2 °C)] 97 6 °F (36 4 °C)  HR:  [61-83] 68  Resp:  [16-69] 21  BP: ()/(51-74) 93/51  SpO2:  [95 %-99 %] 95 %  Body mass index is 32 01 kg/m²  Input and Output Summary (last 24 hours): Intake/Output Summary (Last 24 hours) at 10/26/2022 1123  Last data filed at 10/25/2022 2119  Gross per 24 hour   Intake 1000 ml   Output --   Net 1000 ml       Physical Exam:   Physical Exam  Vitals and nursing note reviewed  Constitutional:       General: She is not in acute distress  Appearance: She is well-developed  HENT:      Head: Normocephalic and atraumatic  Eyes:      General: No scleral icterus  Conjunctiva/sclera: Conjunctivae normal    Cardiovascular:      Rate and Rhythm: Normal rate and regular rhythm  Heart sounds: Normal heart sounds  No murmur heard  No friction rub  No gallop  Pulmonary:      Effort: Pulmonary effort is normal  No respiratory distress  Breath sounds: Normal breath sounds  No wheezing or rales  Abdominal:      General: Bowel sounds are normal  There is no distension  Palpations: Abdomen is soft  Tenderness: There is no abdominal tenderness  Musculoskeletal:         General: Normal range of motion  Skin:     General: Skin is warm  Findings: No rash  Neurological:      Mental Status: She is alert and oriented to person, place, and time          Additional Data:     Labs:  Results from last 7 days   Lab Units 10/25/22  1859   WBC Thousand/uL 10 56*   HEMOGLOBIN g/dL 15 1   HEMATOCRIT % 45 0   PLATELETS Thousands/uL 270   NEUTROS PCT % 64   LYMPHS PCT % 30   MONOS PCT % 4   EOS PCT % 1     Results from last 7 days   Lab Units 10/25/22  1859   SODIUM mmol/L 139   POTASSIUM mmol/L 4 0   CHLORIDE mmol/L 104   CO2 mmol/L 27   BUN mg/dL 16   CREATININE mg/dL 0 84   ANION GAP mmol/L 8   CALCIUM mg/dL 8 9   ALBUMIN g/dL 3 8   TOTAL BILIRUBIN mg/dL 0 41   ALK PHOS U/L 42*   ALT U/L 18   AST U/L 19   GLUCOSE RANDOM mg/dL 91             Results from last 7 days   Lab Units 10/26/22  0443   HEMOGLOBIN A1C % 4 9           Lines/Drains:  Invasive Devices  Report    Peripheral Intravenous Line  Duration           Peripheral IV 10/26/22 Right Hand <1 day                  Telemetry:  Telemetry Orders (From admission, onward)             24 Hour Telemetry Monitoring  Continuous x 24 Hours (Telem)        References:    Telemetry Guidelines   Question:  Reason for 24 Hour Telemetry  Answer:  Acute CVA (>24 hrs old)                          Imaging: No pertinent imaging reviewed  Recent Cultures (last 7 days):         Last 24 Hours Medication List:   Current Facility-Administered Medications   Medication Dose Route Frequency Provider Last Rate   • aspirin  81 mg Oral Daily Hubert GarciaDillan Anderson PA-C     • atorvastatin  40 mg Oral QPM Hubert Anderson PA-C     • enoxaparin  40 mg Subcutaneous Daily [address]     • nicotine  1 patch Transdermal Daily [address]          Today, Patient Was Seen By: Chantel Lawson    **Please Note: This note may have been constructed using a voice recognition system  **

## 2022-10-26 NOTE — UTILIZATION REVIEW
Initial Clinical Review    Admission: Date/Time/Statement:   Admission Orders (From admission, onward)     Ordered        10/25/22 2234  Place in Observation  Once                      Orders Placed This Encounter   Procedures   • Place in Observation     Standing Status:   Standing     Number of Occurrences:   1     Order Specific Question:   Level of Care     Answer:   Med Surg [16]     ED Arrival Information     Expected   -    Arrival   10/25/2022 18:02    Acuity   Emergent            Means of arrival   Walk-In    Escorted by   Family Member    Service   Hospitalist    Admission type   Emergency            Arrival complaint   numbness,headache,dizziness           Chief Complaint   Patient presents with   • Dizziness     Dizziness that comes and goes, worse with movement, for 1 week  Worsening lightheaded and headache X 1 week  Numbness to left side of face and family reports slurred speech that lasts a few seconds--started last Thursday, has not had any episodes today  Initial Presentation: 46 y o  female who presented self from home to [facility] ED  Admitted in observation status for evaluation and treatment of stroke-like symptoms  PMHx: psychiatric disorder  Presented w/ jaundice, dizziness, and slurred speecj  On exam, unreamrakable  Plan: neuro checks, supportive care, Trend labs, replete electrolytes as needed, continue PTA meds  Neurology consulted  10/26/22   Internal Medicine: Pt symptoms seem to be consistent w/ complex migraine  Physical exam unremarkable  Continue ASA/statin, telemetry  Neurology Consult: Reports 3 day history of intermittent headache and dizziness  Family also noted slurred speech with these episodes  Plan: MRI brain, echo, continue ASA/statin, hold headache treatment at this time since symptoms reportedly resolved, PT/OT evals       ED Triage Vitals   Temperature Pulse Respirations Blood Pressure SpO2   10/25/22 1816 10/25/22 1816 10/25/22 1816 10/25/22 1816 10/25/22 1816   98 9 °F (37 2 °C) 83 16 110/74 98 %      Temp Source Heart Rate Source Patient Position - Orthostatic VS BP Location FiO2 (%)   10/25/22 1816 10/25/22 1816 10/25/22 1816 10/25/22 1816 --   Oral Monitor Sitting Left arm       Pain Score       10/25/22 2315       No Pain          Wt Readings from Last 1 Encounters:   10/26/22 79 4 kg (175 lb)     Additional Vital Signs:   Date/Time Temp Pulse Resp BP MAP (mmHg) SpO2 O2 Device Patient Position - Orthostatic VS   10/26/22 0849 -- 72 18 107/54 -- 99 % None (Room air) --   10/26/22 0730 -- 68 -- 105/57 -- -- -- --   10/26/22 0557 -- 61 17 105/57 -- 98 % None (Room air) Lying   10/26/22 0440 -- 74 18 118/70 -- 97 % None (Room air) Lying   10/26/22 0019 97 6 °F (36 4 °C) 77 20 105/61 78 96 % None (Room air) Lying   10/25/22 2317 -- -- 20 -- -- -- -- --   10/25/22 2315 -- 67 69 Abnormal  119/65 83 99 % None (Room air) Sitting   10/25/22 2116 -- 65 18 105/54 -- 97 % None (Room air) Lying   10/25/22 2014 -- 67 25 Abnormal  112/57 77 97 % --        Pertinent Labs/Diagnostic Test Results:   10/26 Echo  •  Left Ventricle: Left ventricular cavity size is normal  Wall thickness is normal  The left ventricular ejection fraction is 60%  Systolic function is normal  Wall motion is normal  Diastolic function is normal   •  Mitral Valve: There is annular calcification  •  Tricuspid Valve: There is mild regurgitation  MRI brain wo contrast   Final Result by Kyleigh Santos MD (10/26 1341)      Unremarkable MRI of the brain  Workstation performed: [id number]         MRV head wo contrast   Final Result by Kyleigh Santos MD (10/26 0563)      No evidence of dural venous sinus thrombosis  Workstation performed: [id number]         CTA head and neck with and without contrast   Final Result by Gopi Menchaca MD (10/25 2213)         1  No evidence of acute infarct, intracranial hemorrhage or mass     2   No stenosis, dissection or occlusion of the carotid or vertebral arteries or major vessels of the [place]  Workstation performed: [id number]               Results from last 7 days   Lab Units 10/25/22  1859   WBC Thousand/uL 10 56*   HEMOGLOBIN g/dL 15 1   HEMATOCRIT % 45 0   PLATELETS Thousands/uL 270   NEUTROS ABS Thousands/µL 6 72         Results from last 7 days   Lab Units 10/25/22  1859   SODIUM mmol/L 139   POTASSIUM mmol/L 4 0   CHLORIDE mmol/L 104   CO2 mmol/L 27   ANION GAP mmol/L 8   BUN mg/dL 16   CREATININE mg/dL 0 84   EGFR ml/min/1 73sq m 87   CALCIUM mg/dL 8 9     Results from last 7 days   Lab Units 10/25/22  1859   AST U/L 19   ALT U/L 18   ALK PHOS U/L 42*   TOTAL PROTEIN g/dL 7 4   ALBUMIN g/dL 3 8   TOTAL BILIRUBIN mg/dL 0 41         Results from last 7 days   Lab Units 10/25/22  1859   GLUCOSE RANDOM mg/dL 91         Results from last 7 days   Lab Units 10/26/22  0443   HEMOGLOBIN A1C % 4 9   EAG mg/dl 94     Results from last 7 days   Lab Units 10/25/22  1859   HS TNI 0HR ng/L <2       ED Treatment:   Medication Administration from 10/25/2022 1802 to 10/26/2022 0908       Date/Time Order Dose Route Action Comments     10/25/2022 1900 sodium chloride 0 9 % bolus 1,000 mL 1,000 mL Intravenous New Bag      10/25/2022 1957 iohexol (OMNIPAQUE) 350 MG/ML injection (SINGLE-DOSE) 100 mL 100 mL Intravenous Given      10/25/2022 2316 aspirin tablet 325 mg 325 mg Oral Given      10/25/2022 2316 nicotine (NICODERM CQ) 21 mg/24 hr TD 24 hr patch 1 patch 1 patch Transdermal Medication Applied      10/25/2022 2316 atorvastatin (LIPITOR) tablet 40 mg 40 mg Oral Given      10/26/2022 0858 aspirin chewable tablet 81 mg 81 mg Oral Given         Past Medical History:   Diagnosis Date   • Depression      Present on Admission:  **None**      Admitting Diagnosis: Dizziness [R42]  Age/Sex: 46 y o  female  Admission Orders:  Regular Diet  Telemetry  Neuro checks  SCDs      Scheduled Medications:  aspirin, 81 mg, Oral, Daily  atorvastatin, 40 mg, Oral, QPM  enoxaparin, 40 mg, Subcutaneous, Daily  nicotine, 1 patch, Transdermal, Daily    Continuous IV Infusions: none    PRN Meds: none      IP CONSULT TO NEUROLOGY    Network Utilization Review Department  ATTENTION: Please call with any questions or concerns to [phone] and carefully listen to the prompts so that you are directed to the right person  All voicemails are confidential   Anastacia Welch all requests for admission clinical reviews, approved or denied determinations and any other requests to dedicated fax number below belonging to the campus where the patient is receiving treatment   List of dedicated fax numbers for the Facilities:  [address] DENIALS (Administrative/Medical Necessity) [phone]   [address] (Maternity/NICU/Pediatrics) [phone]   1 Aubree Mckinney [phone]   [facility] [address] [phone]   [address] [address] [facility] [phone]   15[address] [facility] Basilio Bah [facility] 134 815 [facility] [phone]

## 2022-10-26 NOTE — H&P
[facility]  H&P- Giovanni Guzman 1980, 46 y o  female MRN: [id number]  Unit/Bed#: ED 14 Encounter: [id number]  Primary Care Provider: No primary care provider on file  Date and time admitted to hospital: 10/25/2022  6:35 PM    * Stroke-like symptoms  Assessment & Plan  Patient reporting intermittent dizziness and headache (behind left eye into top of head) for the past week  There is associated left sided facial numbness and family reports intermittent slurred speech during these episodes as well  She is currently without symptom or complaint  /54 (BP Location: Left arm)   Pulse 65   Temp 98 9 °F (37 2 °C) (Oral)   Resp 18   Ht 5' 2" (1 575 m)   Wt 79 4 kg (175 lb)   SpO2 97%   BMI 32 01 kg/m²     · Reports intermittent headache (behind left eye into top of head) for the past week  · Reports hx of migraines; but reports this headache is different  · There is associated dizziness, left sided facial numbness, and family reports intermittent slurred speech during these episodes as well  · The dizziness is worse with head movement when present   · Patient is currently without complaint  · Neuro exam w/o focal deficit at this time  · CTA head/neck w/o acute finding  · Not hypoglycemic; no major electrolyte abnormalities  · TIA vs complex migraine  · Will follow stroke protocol overnight  · ECHO and MRI ordered  · Daily ASA and atorvastatin  · Neuro checks and permissive HTN per stroke protocol  · Neurology consulted       Tobacco abuse  Assessment & Plan  · Recommend complete cessation  · Nicotine patch ordered     VTE Pharmacologic Prophylaxis: VTE Score: 6 High Risk (Score >/= 5) - Pharmacological DVT Prophylaxis Ordered: enoxaparin (Lovenox)  Sequential Compression Devices Ordered    Code Status: Level 1 - Full Code   Discussion with family: update in AM      Anticipated Length of Stay: Patient will be admitted on an observation basis with an anticipated length of stay of less than 2 midnights secondary to stroke like symptoms  Total Time for Visit, including Counseling / Coordination of Care: 45 minutes Greater than 50% of this total time spent on direct patient counseling and coordination of care  Chief Complaint: headache, dizziness, facial numbness, and slurred speech    History of Present Illness:  Latoya Morrison is a 46 y o  female with PMH of anxiety who presents with headache, dizziness, facial numbness, and slurred speech  Patient reporting intermittent dizziness and headache (behind left eye into top of head) for the past week  There is associated left sided facial numbness and family reports intermittent slurred speech during these episodes as well  She is currently without symptom or complaint  All questions answered at the bedside to the best of my ability  Review of Systems:  Review of Systems   Constitutional: Negative for activity change, appetite change, chills, diaphoresis, fatigue and fever  HENT: Negative for congestion, ear pain, nosebleeds and trouble swallowing  Eyes: Negative for pain and visual disturbance  Respiratory: Negative for apnea, cough, chest tightness, shortness of breath and wheezing  Cardiovascular: Negative for chest pain, palpitations and leg swelling  Gastrointestinal: Negative for abdominal distention, abdominal pain, blood in stool, constipation, diarrhea, nausea and vomiting  Endocrine: Negative for cold intolerance, heat intolerance and polyuria  Genitourinary: Negative for difficulty urinating, dysuria, flank pain and hematuria  Musculoskeletal: Negative for arthralgias, neck pain and neck stiffness  Skin: Negative for color change, rash and wound  Neurological: Positive for dizziness, speech difficulty, numbness and headaches  Negative for tremors, syncope, weakness and light-headedness  Hematological: Negative for adenopathy  All other systems reviewed and are negative        Past Medical and Surgical History:   Past Medical History:   Diagnosis Date   • Depression        History reviewed  No pertinent surgical history  Meds/Allergies:  Prior to Admission medications    Not on File     I have reviewed home medications with patient personally  Allergies: Allergies   Allergen Reactions   • Nickel Rash       Social History:  Marital Status: Single   Occupation: N/A  Patient Pre-hospital Living Situation: Home  Patient Pre-hospital Level of Mobility: walks  Patient Pre-hospital Diet Restrictions: None  Substance Use History:   Social History     Substance and Sexual Activity   Alcohol Use Never     Social History     Tobacco Use   Smoking Status Current Every Day Smoker   • Packs/day: 1 50   Smokeless Tobacco Never Used     Social History     Substance and Sexual Activity   Drug Use Never       Family History:  History reviewed  No pertinent family history  Physical Exam:     Vitals:   Blood Pressure: 119/65 (10/25/22 2315)  Pulse: 67 (10/25/22 2315)  Temperature: 98 9 °F (37 2 °C) (10/25/22 1816)  Temp Source: Oral (10/25/22 1816)  Respirations: 20 (10/25/22 2317)  Height: 5' 2" (157 5 cm) (10/25/22 1816)  Weight - Scale: 79 4 kg (175 lb) (10/25/22 1816)  SpO2: 99 % (10/25/22 2315)    Physical Exam  Vitals and nursing note reviewed  Constitutional:       General: She is not in acute distress  Appearance: Normal appearance  HENT:      Head: Normocephalic and atraumatic  Right Ear: External ear normal       Left Ear: External ear normal       Nose: Nose normal       Mouth/Throat:      Mouth: Mucous membranes are moist    Eyes:      Extraocular Movements: Extraocular movements intact  Pupils: Pupils are equal, round, and reactive to light  Cardiovascular:      Rate and Rhythm: Normal rate and regular rhythm  Pulses: Normal pulses  Heart sounds: Normal heart sounds  No murmur heard  Pulmonary:      Effort: Pulmonary effort is normal  No respiratory distress        Breath sounds: Normal breath sounds  No wheezing or rales  Chest:      Chest wall: No tenderness  Abdominal:      General: Bowel sounds are normal  There is no distension  Palpations: Abdomen is soft  There is no mass  Tenderness: There is no abdominal tenderness  There is no guarding  Musculoskeletal:         General: No swelling or tenderness  Cervical back: Normal range of motion and neck supple  No rigidity or tenderness  Right lower leg: No edema  Left lower leg: No edema  Skin:     General: Skin is warm and dry  Capillary Refill: Capillary refill takes less than 2 seconds  Findings: No lesion or rash  Neurological:      General: No focal deficit present  Mental Status: She is alert and oriented to person, place, and time  Motor: No weakness  Psychiatric:         Mood and Affect: Mood normal           Additional Data:     Lab Results:  Results from last 7 days   Lab Units 10/25/22  1859   WBC Thousand/uL 10 56*   HEMOGLOBIN g/dL 15 1   HEMATOCRIT % 45 0   PLATELETS Thousands/uL 270   NEUTROS PCT % 64   LYMPHS PCT % 30   MONOS PCT % 4   EOS PCT % 1     Results from last 7 days   Lab Units 10/25/22  1859   SODIUM mmol/L 139   POTASSIUM mmol/L 4 0   CHLORIDE mmol/L 104   CO2 mmol/L 27   BUN mg/dL 16   CREATININE mg/dL 0 84   ANION GAP mmol/L 8   CALCIUM mg/dL 8 9   ALBUMIN g/dL 3 8   TOTAL BILIRUBIN mg/dL 0 41   ALK PHOS U/L 42*   ALT U/L 18   AST U/L 19   GLUCOSE RANDOM mg/dL 91                       Imaging: Reviewed radiology reports from this admission including: CTA head/neck  CTA head and neck with and without contrast   Final Result by Lyndsay Everett MD (10/25 2213)         1  No evidence of acute infarct, intracranial hemorrhage or mass  2   No stenosis, dissection or occlusion of the carotid or vertebral arteries or major vessels of the [place] of [place]                    Workstation performed: [id number]         MRI Inpatient Order    (Results Pending) EKG and Other Studies Reviewed on Admission:   · EKG: NSR  HR 72     ** Please Note: This note has been constructed using a voice recognition system   **

## 2022-10-26 NOTE — ASSESSMENT & PLAN NOTE
- Patient follows with neurology team as an outpatient and has an appointment with a new provider on November 7, 2022    - Continue with Magnesium oxide as an outpatient as patient reports this is typically effective for her

## 2022-10-27 LAB
ATRIAL RATE: 72 BPM
P AXIS: 72 DEGREES
PR INTERVAL: 146 MS
QRS AXIS: 64 DEGREES
QRSD INTERVAL: 78 MS
QT INTERVAL: 390 MS
QTC INTERVAL: 427 MS
T WAVE AXIS: 71 DEGREES
VENTRICULAR RATE: 72 BPM

## 2022-10-27 PROCEDURE — 93010 ELECTROCARDIOGRAM REPORT: CPT | Performed by: INTERNAL MEDICINE

## 2023-01-10 ENCOUNTER — HOSPITAL ENCOUNTER (EMERGENCY)
Facility: HOSPITAL | Age: 43
Discharge: HOME | End: 2023-01-10
Attending: EMERGENCY MEDICINE

## 2023-01-10 VITALS
OXYGEN SATURATION: 97 % | RESPIRATION RATE: 18 BRPM | DIASTOLIC BLOOD PRESSURE: 70 MMHG | SYSTOLIC BLOOD PRESSURE: 119 MMHG | HEART RATE: 72 BPM | TEMPERATURE: 97.9 F

## 2023-01-10 DIAGNOSIS — N75.0 INFECTED CYST OF BARTHOLIN'S GLAND DUCT: Primary | ICD-10-CM

## 2023-01-10 RX ORDER — SULFAMETHOXAZOLE AND TRIMETHOPRIM 800; 160 MG/1; MG/1
1 TABLET ORAL 2 TIMES DAILY
Qty: 14 TABLET | Refills: 0 | Status: SHIPPED | OUTPATIENT
Start: 2023-01-10 | End: 2023-01-17

## 2023-01-10 RX ORDER — LIDOCAINE HYDROCHLORIDE AND EPINEPHRINE 10; 10 MG/ML; UG/ML
20 INJECTION, SOLUTION INFILTRATION; PERINEURAL ONCE
Status: COMPLETED | OUTPATIENT
Start: 2023-01-10 | End: 2023-01-10

## 2023-01-10 RX ADMIN — LIDOCAINE HYDROCHLORIDE,EPINEPHRINE BITARTRATE 20 ML: 10; .01 INJECTION, SOLUTION INFILTRATION; PERINEURAL at 12:10

## 2023-01-10 NOTE — ED PROVIDER NOTES
History  Chief Complaint   Patient presents with   • Abscess     Reported abscess to groin/vagina area    • Vaginal Pain     Patient is a 82-year-old female  She is not a diabetic  She has had prior pelvic abscesses  She presents to the emergency room complaining of 2 days an abscess in the vaginal area  No fever or chills  Moderate prior episodes  Moderate in severity without relieving factors  Prior to Admission Medications   Prescriptions Last Dose Informant Patient Reported? Taking?   aspirin 81 mg chewable tablet   No No   Sig: Chew 1 tablet (81 mg total) daily Do not start before October 27, 2022  Facility-Administered Medications: None       Past Medical History:   Diagnosis Date   • Depression        No past surgical history on file  No family history on file  I have reviewed and agree with the history as documented  E-Cigarette/Vaping     E-Cigarette/Vaping Substances     Social History     Tobacco Use   • Smoking status: Every Day     Packs/day: 1 50     Types: Cigarettes   • Smokeless tobacco: Never   Substance Use Topics   • Alcohol use: Never   • Drug use: Never       Review of Systems   Constitutional: Negative for chills and fever  Genitourinary: Positive for vaginal pain  Negative for dysuria  All other systems reviewed and are negative  Physical Exam  Physical Exam  Vitals reviewed  Constitutional:       General: She is not in acute distress  HENT:      Head: Normocephalic and atraumatic  Nose: Nose normal       Mouth/Throat:      Mouth: Mucous membranes are moist    Eyes:      General:         Right eye: No discharge  Left eye: No discharge  Conjunctiva/sclera: Conjunctivae normal    Pulmonary:      Effort: Pulmonary effort is normal  No respiratory distress  Genitourinary:     Comments: There is tenderness and swelling redness to the left labia majora  3 cm in diameter  Musculoskeletal:         General: No deformity or signs of injury  Cervical back: Normal range of motion and neck supple  Skin:     General: Skin is warm and dry  Findings: No rash  Neurological:      General: No focal deficit present  Mental Status: She is alert and oriented to person, place, and time  Psychiatric:         Mood and Affect: Mood normal          Behavior: Behavior normal          Vital Signs  ED Triage Vitals [01/10/23 1057]   Temperature Pulse Respirations Blood Pressure SpO2   97 9 °F (36 6 °C) 72 18 119/70 97 %      Temp src Heart Rate Source Patient Position - Orthostatic VS BP Location FiO2 (%)   -- -- -- -- --      Pain Score       --           Vitals:    01/10/23 1057   BP: 119/70   Pulse: 72         Visual Acuity      ED Medications  Medications   lidocaine-epinephrine (XYLOCAINE/EPINEPHRINE) 1 %-1:100,000 injection 20 mL (has no administration in time range)       Diagnostic Studies  Results Reviewed     None                 No orders to display              Procedures  Incision and drain    Date/Time: 1/10/2023 12:01 PM  Performed by: Sandy Bauman MD  Authorized by: Sandy Bauman MD     Procedure details:     Drainage:  Purulent    Wound treatment:  Wound left open  Comments:      Area was prepped and draped in usual sterile manner  Anesthetized using 1% lidocaine with epinephrine  Incision and drainage was done using 11 blade  Small amount of pus was returned  Patient tolerated procedure well  No significant hemorrhage  ED Course                                             MDM    Disposition  Final diagnoses:   None     ED Disposition     None      Follow-up Information    None         Patient's Medications   Discharge Prescriptions    No medications on file       No discharge procedures on file      PDMP Review     None          ED Provider  Electronically Signed by           Sandy Bauman MD  01/10/23 1975

## 2023-01-10 NOTE — Clinical Note
Jennifer Goldsmith was seen and treated in our emergency department on 1/10/2023  Diagnosis:     Lennie Garibay  may return to work on return date  She may return on this date: 01/11/2023         If you have any questions or concerns, please don't hesitate to call        Yin Lacy MD    ______________________________           _______________          _______________  Hospital Representative                              Date                                Time

## 2023-08-24 ENCOUNTER — HOSPITAL ENCOUNTER (EMERGENCY)
Facility: HOSPITAL | Age: 43
Discharge: HOME | End: 2023-08-24
Attending: EMERGENCY MEDICINE | Admitting: EMERGENCY MEDICINE
Payer: COMMERCIAL

## 2023-08-24 ENCOUNTER — APPOINTMENT (EMERGENCY)
Dept: RADIOLOGY | Facility: HOSPITAL | Age: 43
End: 2023-08-24
Payer: COMMERCIAL

## 2023-08-24 VITALS
RESPIRATION RATE: 18 BRPM | DIASTOLIC BLOOD PRESSURE: 70 MMHG | OXYGEN SATURATION: 98 % | TEMPERATURE: 98.4 F | SYSTOLIC BLOOD PRESSURE: 118 MMHG | HEART RATE: 76 BPM

## 2023-08-24 DIAGNOSIS — R05.3 CHRONIC COUGH: Primary | ICD-10-CM

## 2023-08-24 DIAGNOSIS — R07.81 COSTAL MARGIN PAIN: ICD-10-CM

## 2023-08-24 LAB
ANION GAP SERPL CALCULATED.3IONS-SCNC: 3 MMOL/L
BASOPHILS # BLD AUTO: 0.04 THOUSANDS/ÂΜL (ref 0–0.1)
BASOPHILS NFR BLD AUTO: 1 % (ref 0–1)
BUN SERPL-MCNC: 13 MG/DL (ref 5–25)
CALCIUM SERPL-MCNC: 9.1 MG/DL (ref 8.4–10.2)
CARDIAC TROPONIN I PNL SERPL HS: <2 NG/L
CHLORIDE SERPL-SCNC: 108 MMOL/L (ref 96–108)
CO2 SERPL-SCNC: 24 MMOL/L (ref 21–32)
CREAT SERPL-MCNC: 0.62 MG/DL (ref 0.6–1.3)
EOSINOPHIL # BLD AUTO: 0.13 THOUSAND/ÂΜL (ref 0–0.61)
EOSINOPHIL NFR BLD AUTO: 2 % (ref 0–6)
ERYTHROCYTE [DISTWIDTH] IN BLOOD BY AUTOMATED COUNT: 12.3 % (ref 11.6–15.1)
GFR SERPL CREATININE-BSD FRML MDRD: 113 ML/MIN/1.73SQ M
GLUCOSE SERPL-MCNC: 108 MG/DL (ref 65–140)
HCT VFR BLD AUTO: 44.1 % (ref 34.8–46.1)
HGB BLD-MCNC: 15.1 G/DL (ref 11.5–15.4)
IMM GRANULOCYTES # BLD AUTO: 0.01 THOUSAND/UL (ref 0–0.2)
IMM GRANULOCYTES NFR BLD AUTO: 0 % (ref 0–2)
LYMPHOCYTES # BLD AUTO: 2.28 THOUSANDS/ÂΜL (ref 0.6–4.47)
LYMPHOCYTES NFR BLD AUTO: 32 % (ref 14–44)
MCH RBC QN AUTO: 32.8 PG (ref 26.8–34.3)
MCHC RBC AUTO-ENTMCNC: 34.2 G/DL (ref 31.4–37.4)
MCV RBC AUTO: 96 FL (ref 82–98)
MONOCYTES # BLD AUTO: 0.34 THOUSAND/ÂΜL (ref 0.17–1.22)
MONOCYTES NFR BLD AUTO: 5 % (ref 4–12)
NEUTROPHILS # BLD AUTO: 4.24 THOUSANDS/ÂΜL (ref 1.85–7.62)
NEUTS SEG NFR BLD AUTO: 60 % (ref 43–75)
NRBC BLD AUTO-RTO: 0 /100 WBCS
PLATELET # BLD AUTO: 237 THOUSANDS/UL (ref 149–390)
PMV BLD AUTO: 9.8 FL (ref 8.9–12.7)
POTASSIUM SERPL-SCNC: 4.3 MMOL/L (ref 3.5–5.3)
RBC # BLD AUTO: 4.6 MILLION/UL (ref 3.81–5.12)
SODIUM SERPL-SCNC: 135 MMOL/L (ref 135–147)
WBC # BLD AUTO: 7.04 THOUSAND/UL (ref 4.31–10.16)

## 2023-08-24 PROCEDURE — 80048 BASIC METABOLIC PNL TOTAL CA: CPT | Performed by: EMERGENCY MEDICINE

## 2023-08-24 PROCEDURE — 96374 THER/PROPH/DIAG INJ IV PUSH: CPT

## 2023-08-24 PROCEDURE — 85025 COMPLETE CBC W/AUTO DIFF WBC: CPT | Performed by: EMERGENCY MEDICINE

## 2023-08-24 PROCEDURE — 84484 ASSAY OF TROPONIN QUANT: CPT | Performed by: EMERGENCY MEDICINE

## 2023-08-24 PROCEDURE — 71101 X-RAY EXAM UNILAT RIBS/CHEST: CPT

## 2023-08-24 PROCEDURE — 36415 COLL VENOUS BLD VENIPUNCTURE: CPT | Performed by: EMERGENCY MEDICINE

## 2023-08-24 PROCEDURE — 99284 EMERGENCY DEPT VISIT MOD MDM: CPT

## 2023-08-24 RX ORDER — ACETAMINOPHEN 325 MG/1
975 TABLET ORAL ONCE
Status: COMPLETED | OUTPATIENT
Start: 2023-08-24 | End: 2023-08-24

## 2023-08-24 RX ORDER — METHOCARBAMOL 500 MG/1
1000 TABLET, FILM COATED ORAL ONCE
Status: COMPLETED | OUTPATIENT
Start: 2023-08-24 | End: 2023-08-24

## 2023-08-24 RX ORDER — METHOCARBAMOL 500 MG/1
1000 TABLET, FILM COATED ORAL 3 TIMES DAILY PRN
Qty: 30 TABLET | Refills: 0 | Status: SHIPPED | OUTPATIENT
Start: 2023-08-24

## 2023-08-24 RX ORDER — BENZONATATE 100 MG/1
100 CAPSULE ORAL EVERY 8 HOURS
Qty: 21 CAPSULE | Refills: 0 | Status: SHIPPED | OUTPATIENT
Start: 2023-08-24

## 2023-08-24 RX ORDER — KETOROLAC TROMETHAMINE 30 MG/ML
15 INJECTION, SOLUTION INTRAMUSCULAR; INTRAVENOUS ONCE
Status: COMPLETED | OUTPATIENT
Start: 2023-08-24 | End: 2023-08-24

## 2023-08-24 RX ADMIN — METHOCARBAMOL 1000 MG: 500 TABLET ORAL at 15:17

## 2023-08-24 RX ADMIN — ACETAMINOPHEN 975 MG: 325 TABLET, FILM COATED ORAL at 15:17

## 2023-08-24 RX ADMIN — KETOROLAC TROMETHAMINE 15 MG: 30 INJECTION, SOLUTION INTRAMUSCULAR; INTRAVENOUS at 15:17

## 2023-08-24 NOTE — ED PROVIDER NOTES
History  Chief Complaint   Patient presents with   • Abdominal Pain     Patient reports left sided abdominal pain since last night. Denies nausea, vomiting and diarrhea. Movement makes it worse. 46-year-old female no reported past history smokes a pack and 1/2 to 2 packs of cigarettes per day presenting with left-sided costal margin pain. Patient reports acute onset of pain after a strong cough last night. Pain is reproducible with deep breath and twisting turning and overlying tenderness. States that cough is chronic without recent change and she occasionally produces small amounts of clear sputum. Denies any shortness of breath. Denies any abdominal pain nausea vomiting diarrhea. Denies any trauma. Denies any other complaints. Chart reviewed. Past Medical History:  No date: Depression  Family History: non-contributory  Social History            Prior to Admission Medications   Prescriptions Last Dose Informant Patient Reported? Taking?   aspirin 81 mg chewable tablet   No No   Sig: Chew 1 tablet (81 mg total) daily Do not start before October 27, 2022. Facility-Administered Medications: None       Past Medical History:   Diagnosis Date   • Depression        History reviewed. No pertinent surgical history. History reviewed. No pertinent family history. I have reviewed and agree with the history as documented. E-Cigarette/Vaping   • E-Cigarette Use Never User      E-Cigarette/Vaping Substances     Social History     Tobacco Use   • Smoking status: Every Day     Packs/day: 1.50     Types: Cigarettes   • Smokeless tobacco: Never   Vaping Use   • Vaping Use: Never used   Substance Use Topics   • Alcohol use: Never   • Drug use: Never       Review of Systems   Constitutional: Negative for appetite change, chills, diaphoresis, fever and unexpected weight change. HENT: Negative for congestion and rhinorrhea. Eyes: Negative for photophobia and visual disturbance.    Respiratory: Negative for cough, chest tightness and shortness of breath. Cardiovascular: Positive for chest pain. Negative for palpitations and leg swelling. Gastrointestinal: Negative for abdominal distention, abdominal pain, blood in stool, constipation, diarrhea, nausea and vomiting. Genitourinary: Negative for dysuria and hematuria. Musculoskeletal: Negative for back pain, joint swelling, neck pain and neck stiffness. Skin: Negative for color change, pallor, rash and wound. Neurological: Negative for dizziness, syncope, weakness, light-headedness and headaches. Psychiatric/Behavioral: Negative for agitation. All other systems reviewed and are negative. Physical Exam  Physical Exam  Vitals and nursing note reviewed. Constitutional:       General: She is not in acute distress. Appearance: Normal appearance. She is well-developed. She is not ill-appearing, toxic-appearing or diaphoretic. HENT:      Head: Normocephalic and atraumatic. Nose: Nose normal. No congestion or rhinorrhea. Mouth/Throat:      Mouth: Mucous membranes are moist.      Pharynx: Oropharynx is clear. No oropharyngeal exudate or posterior oropharyngeal erythema. Eyes:      General: No scleral icterus. Right eye: No discharge. Left eye: No discharge. Extraocular Movements: Extraocular movements intact. Conjunctiva/sclera: Conjunctivae normal.      Pupils: Pupils are equal, round, and reactive to light. Neck:      Vascular: No JVD. Trachea: No tracheal deviation. Comments: Supple. Normal range of motion. Cardiovascular:      Rate and Rhythm: Normal rate and regular rhythm. Heart sounds: Normal heart sounds. No murmur heard. No friction rub. No gallop. Comments: Normal rate and regular rhythm  Pulmonary:      Effort: Pulmonary effort is normal. No respiratory distress. Breath sounds: Normal breath sounds. No stridor. No wheezing or rales.       Comments: Clear to auscultation bilaterally  Chest:      Chest wall: No tenderness. Abdominal:      General: Bowel sounds are normal. There is no distension. Palpations: Abdomen is soft. Tenderness: There is no abdominal tenderness. There is no right CVA tenderness, left CVA tenderness, guarding or rebound. Comments: Soft, nontender, nondistended. Normal bowel sounds throughout   Musculoskeletal:         General: No swelling, tenderness, deformity or signs of injury. Normal range of motion. Cervical back: Normal range of motion and neck supple. No rigidity. No muscular tenderness. Right lower leg: No edema. Left lower leg: No edema. Lymphadenopathy:      Cervical: No cervical adenopathy. Skin:     General: Skin is warm and dry. Coloration: Skin is not pale. Findings: No erythema or rash. Comments: Left-sided costal margin tenderness without crepitus   Neurological:      General: No focal deficit present. Mental Status: She is alert. Mental status is at baseline. Sensory: No sensory deficit. Motor: No weakness or abnormal muscle tone. Coordination: Coordination normal.      Gait: Gait normal.      Comments: Alert. Strength and sensation grossly intact. Ambulatory without difficulty at baseline. Psychiatric:         Behavior: Behavior normal.         Thought Content:  Thought content normal.         Vital Signs  ED Triage Vitals   Temperature Pulse Respirations Blood Pressure SpO2   08/24/23 1346 08/24/23 1346 08/24/23 1346 08/24/23 1346 08/24/23 1346   98.4 °F (36.9 °C) 76 18 118/70 98 %      Temp Source Heart Rate Source Patient Position - Orthostatic VS BP Location FiO2 (%)   08/24/23 1346 08/24/23 1346 -- 08/24/23 1346 --   Tympanic Monitor  Left arm       Pain Score       08/24/23 1500       9           Vitals:    08/24/23 1346   BP: 118/70   Pulse: 76         Visual Acuity      ED Medications  Medications   acetaminophen (TYLENOL) tablet 975 mg (975 mg Oral Given 8/24/23 1517)   ketorolac (TORADOL) injection 15 mg (15 mg Intravenous Given 8/24/23 1517)   methocarbamol (ROBAXIN) tablet 1,000 mg (1,000 mg Oral Given 8/24/23 1517)       Diagnostic Studies  Results Reviewed     Procedure Component Value Units Date/Time    HS Troponin 0hr (reflex protocol) [[id number]]  (Normal) Collected: 08/24/23 1519    Lab Status: Final result Specimen: Blood from Arm, Left Updated: 08/24/23 1547     hs TnI 0hr <2 ng/L     Basic metabolic panel [[id number]] Collected: 08/24/23 1519    Lab Status: Final result Specimen: Blood from Arm, Left Updated: 08/24/23 1541     Sodium 135 mmol/L      Potassium 4.3 mmol/L      Chloride 108 mmol/L      CO2 24 mmol/L      ANION GAP 3 mmol/L      BUN 13 mg/dL      Creatinine 0.62 mg/dL      Glucose 108 mg/dL      Calcium 9.1 mg/dL      eGFR 113 ml/min/1.73sq m     Narrative:      [place] guidelines for Chronic Kidney Disease (CKD):   •  Stage 1 with normal or high GFR (GFR > 90 mL/min/1.73 square meters)  •  Stage 2 Mild CKD (GFR = 60-89 mL/min/1.73 square meters)  •  Stage 3A Moderate CKD (GFR = 45-59 mL/min/1.73 square meters)  •  Stage 3B Moderate CKD (GFR = 30-44 mL/min/1.73 square meters)  •  Stage 4 Severe CKD (GFR = 15-29 mL/min/1.73 square meters)  •  Stage 5 End Stage CKD (GFR <15 mL/min/1.73 square meters)  Note: GFR calculation is accurate only with a steady state creatinine    CBC and differential [[id number]] Collected: 08/24/23 1519    Lab Status: Final result Specimen: Blood from Arm, Left Updated: 08/24/23 1525     WBC 7.04 Thousand/uL      RBC 4.60 Million/uL      Hemoglobin 15.1 g/dL      Hematocrit 44.1 %      MCV 96 fL      MCH 32.8 pg      MCHC 34.2 g/dL      RDW 12.3 %      MPV 9.8 fL      Platelets 566 Thousands/uL      nRBC 0 /100 WBCs      Neutrophils Relative 60 %      Immat GRANS % 0 %      Lymphocytes Relative 32 %      Monocytes Relative 5 %      Eosinophils Relative 2 %      Basophils Relative 1 % Neutrophils Absolute 4.24 Thousands/µL      Immature Grans Absolute 0.01 Thousand/uL      Lymphocytes Absolute 2.28 Thousands/µL      Monocytes Absolute 0.34 Thousand/µL      Eosinophils Absolute 0.13 Thousand/µL      Basophils Absolute 0.04 Thousands/µL                  XR ribs with pa chest min 3 views LEFT   Final Result by Javier Islas MD (08/24 1603)      No active cardiopulmonary disease. No evidence of rib fractures. Workstation performed: [id number]                    Procedures  Procedures         ED Course             HEART Risk Score    Flowsheet Row Most Recent Value   Heart Score Risk Calculator    History 0 Filed at: 08/24/2023 1600   ECG 0 Filed at: 08/24/2023 1600   Age 0 Filed at: 08/24/2023 1600   Risk Factors 1 Filed at: 08/24/2023 1600   Troponin 0 Filed at: 08/24/2023 1600   HEART Score 1 Filed at: 08/24/2023 1600                                      Medical Decision Making  44-year-old female no reported past history smokes a pack and 1/2 to 2 packs of cigarettes per day presenting with left-sided costal margin pain. Cute onset of pain after strong cough with reproducibility and overlying tenderness. Suspect likely muscular strain now with some possible spasm. Plan for cardiac evaluation including EKG and troponin plus chest x-ray. Symptom management with oral and IV medications. Reassess. EKG interpreted by me with normal sinus rhythm and no acute ST abnormality. Labs interpreted by me without significant acute process. X-ray interpreted by me without acute cardiopulmonary process. Symptoms improved after medications. Prescription sent to pharmacy. Suspect muscular strain. Discussed results and recommendations. Advised follow up PCP. Medication recommendations. Given instructions and return precautions. Patient/family at bedside acknowledged understanding of all written and verbal instructions and return precautions. Discharged.      Amount and/or Complexity of Data Reviewed  Labs: ordered. Radiology: ordered. Risk  OTC drugs. Prescription drug management. Disposition  Final diagnoses:   Chronic cough   Costal margin pain     Time reflects when diagnosis was documented in both MDM as applicable and the Disposition within this note     Time User Action Codes Description Comment    8/24/2023  3:40 PM Donavan Johnson Add [R05.3] Chronic cough     8/24/2023  3:40 PM Donavan Johnson Add [R07.81] Costal margin pain       ED Disposition     ED Disposition   Discharge    Condition   Stable    Date/Time   Thu Aug 24, 2023  3:56 PM    Comment   Arnulfo Moise discharge to home/self care. Follow-up Information     Follow up With Specialties Details Why Contact Info    [facility]  Call  for [phone]            Discharge Medication List as of 8/24/2023  3:56 PM      START taking these medications    Details   benzonatate (TESSALON PERLES) 100 mg capsule Take 1 capsule (100 mg total) by mouth every 8 (eight) hours, Starting Thu 8/24/2023, Normal      methocarbamol (ROBAXIN) 500 mg tablet Take 2 tablets (1,000 mg total) by mouth 3 (three) times a day as needed for muscle spasms (left rib pain), Starting Thu 8/24/2023, Normal         CONTINUE these medications which have NOT CHANGED    Details   aspirin 81 mg chewable tablet Chew 1 tablet (81 mg total) daily Do not start before October 27, 2022., Starting Thu 10/27/2022, Until Sat 11/26/2022, Normal             No discharge procedures on file.     PDMP Review     None          ED Provider  Electronically Signed by           Roni Hampton MD  08/24/23 [id number]

## 2023-08-24 NOTE — DISCHARGE INSTRUCTIONS
Please follow up PCP. Can also consider over-the-counter cough suppressant such as Delsym or Robitussin. Recommend tylenol 650 mg and ibuprofen 600 mg every 6 hours as needed for pain. If taking the muscle relaxant Robaxin, please do not drive or operate heavy machinery or perform other dangerous tasks. Pain may potentially persist for upwards of a couple weeks given location and chronic cough. Please return for severe chest pain, significant shortness of breath, severely worsening symptoms, or any other concerning signs or symptoms. Please refer to the following documents for additional instructions and return precautions.